# Patient Record
Sex: MALE | Race: WHITE | NOT HISPANIC OR LATINO | Employment: FULL TIME | URBAN - METROPOLITAN AREA
[De-identification: names, ages, dates, MRNs, and addresses within clinical notes are randomized per-mention and may not be internally consistent; named-entity substitution may affect disease eponyms.]

---

## 2018-10-02 ENCOUNTER — TRANSCRIBE ORDERS (OUTPATIENT)
Dept: ADMINISTRATIVE | Age: 23
End: 2018-10-02

## 2018-10-02 ENCOUNTER — APPOINTMENT (OUTPATIENT)
Dept: LAB | Age: 23
End: 2018-10-02
Attending: PREVENTIVE MEDICINE

## 2018-10-02 DIAGNOSIS — Z11.1 SCREENING EXAMINATION FOR PULMONARY TUBERCULOSIS: Primary | ICD-10-CM

## 2018-10-02 DIAGNOSIS — Z11.1 SCREENING EXAMINATION FOR PULMONARY TUBERCULOSIS: ICD-10-CM

## 2018-10-02 PROCEDURE — 36415 COLL VENOUS BLD VENIPUNCTURE: CPT

## 2018-10-02 PROCEDURE — 86480 TB TEST CELL IMMUN MEASURE: CPT

## 2018-10-03 LAB
GAMMA INTERFERON BACKGROUND BLD IA-ACNC: 0.03 IU/ML
M TB IFN-G BLD-IMP: NEGATIVE
M TB IFN-G CD4+ BCKGRND COR BLD-ACNC: 0 IU/ML
M TB IFN-G CD4+ BCKGRND COR BLD-ACNC: 0 IU/ML
MITOGEN IGNF BCKGRD COR BLD-ACNC: >10 IU/ML

## 2020-01-21 ENCOUNTER — HOSPITAL ENCOUNTER (EMERGENCY)
Facility: HOSPITAL | Age: 25
Discharge: HOME | End: 2020-01-21
Attending: EMERGENCY MEDICINE | Admitting: EMERGENCY MEDICINE
Payer: OTHER MISCELLANEOUS

## 2020-01-21 VITALS
BODY MASS INDEX: 37.71 KG/M2 | OXYGEN SATURATION: 97 % | HEART RATE: 69 BPM | RESPIRATION RATE: 15 BRPM | SYSTOLIC BLOOD PRESSURE: 134 MMHG | HEIGHT: 72 IN | DIASTOLIC BLOOD PRESSURE: 93 MMHG | TEMPERATURE: 98.7 F | WEIGHT: 278.44 LBS

## 2020-01-21 DIAGNOSIS — Z77.21 CONTACT W AND EXPOSURE TO POTENTIALLY HAZARDOUS BODY FLUIDS: Primary | ICD-10-CM

## 2020-01-21 LAB
ALT SERPL W P-5'-P-CCNC: 30 U/L (ref 12–78)
HBV SURFACE AB SER-ACNC: 43.99 MIU/ML
HBV SURFACE AG SER QL: NORMAL
HCV AB SER QL: NORMAL

## 2020-01-21 PROCEDURE — 36415 COLL VENOUS BLD VENIPUNCTURE: CPT | Performed by: EMERGENCY MEDICINE

## 2020-01-21 PROCEDURE — 86803 HEPATITIS C AB TEST: CPT | Performed by: EMERGENCY MEDICINE

## 2020-01-21 PROCEDURE — 99282 EMERGENCY DEPT VISIT SF MDM: CPT

## 2020-01-21 PROCEDURE — 87389 HIV-1 AG W/HIV-1&-2 AB AG IA: CPT | Performed by: EMERGENCY MEDICINE

## 2020-01-21 PROCEDURE — 84460 ALANINE AMINO (ALT) (SGPT): CPT | Performed by: EMERGENCY MEDICINE

## 2020-01-21 PROCEDURE — 99284 EMERGENCY DEPT VISIT MOD MDM: CPT | Performed by: EMERGENCY MEDICINE

## 2020-01-21 PROCEDURE — 87340 HEPATITIS B SURFACE AG IA: CPT | Performed by: EMERGENCY MEDICINE

## 2020-01-21 PROCEDURE — 86706 HEP B SURFACE ANTIBODY: CPT | Performed by: EMERGENCY MEDICINE

## 2020-01-21 NOTE — ED PROVIDER NOTES
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, the source patient does not have any known blood borne infectious diseases  He was started with a suture needle, solid point needle  He irrigated the area  He is up-to-date on tetanus  He has no other injuries  None       History reviewed  No pertinent past medical history  History reviewed  No pertinent surgical history  History reviewed  No pertinent family history  I have reviewed and agree with the history as documented  Social History     Tobacco Use    Smoking status: Never Smoker    Smokeless tobacco: Never Used   Substance Use Topics    Alcohol use: Not Currently     Frequency: Never    Drug use: Never        Review of Systems   Constitutional: Negative for appetite change, chills, fatigue and fever  HENT: Negative for sneezing and sore throat  Eyes: Negative for visual disturbance  Respiratory: Negative for cough, choking, chest tightness, shortness of breath and wheezing  Cardiovascular: Negative for chest pain and palpitations  Gastrointestinal: Negative for abdominal pain, constipation, diarrhea, nausea and vomiting  Genitourinary: Negative for difficulty urinating and dysuria  Neurological: Negative for dizziness, weakness, light-headedness, numbness and headaches  All other systems reviewed and are negative  Physical Exam  Physical Exam   Constitutional: He is oriented to person, place, and time  He appears well-developed and well-nourished  No distress  HENT:   Head: Normocephalic and atraumatic  Mouth/Throat: Oropharynx is clear and moist    Eyes: Pupils are equal, round, and reactive to light  EOM are normal    Neck: No JVD present  No tracheal deviation present  Cardiovascular: Normal rate, regular rhythm, normal heart sounds and intact distal pulses  Exam reveals no gallop and no friction rub  No murmur heard  Pulmonary/Chest: Effort normal and breath sounds normal  No respiratory distress  He has no wheezes  He has no rales  Abdominal: Soft  Bowel sounds are normal  He exhibits no distension  There is no tenderness  There is no rebound and no guarding  Neurological: He is alert and oriented to person, place, and time  No cranial nerve deficit  He exhibits normal muscle tone  Skin: Skin is warm and dry  He is not diaphoretic  No pallor  Very small punctate injury to left index finger  Bleeding is controlled  Psychiatric: He has a normal mood and affect  His behavior is normal    Nursing note and vitals reviewed  Vital Signs  ED Triage Vitals [01/21/20 1410]   Temperature Pulse Respirations Blood Pressure SpO2   98 7 °F (37 1 °C) 69 15 134/93 97 %      Temp Source Heart Rate Source Patient Position - Orthostatic VS BP Location FiO2 (%)   Oral Monitor Sitting Right arm --      Pain Score       No Pain           Vitals:    01/21/20 1410   BP: 134/93   Pulse: 69   Patient Position - Orthostatic VS: Sitting         Visual Acuity      ED Medications  Medications - No data to display    Diagnostic Studies  Results Reviewed     Procedure Component Value Units Date/Time    ALT [[id number]]  (Normal) Collected:  01/21/20 1421    Lab Status:  Final result Specimen:  Blood from Arm, Left Updated:  01/21/20 1446     ALT 30 U/L     Hepatitis B surface antigen [[id number]] Collected:  01/21/20 1421    Lab Status: In process Specimen:  Blood from Arm, Left Updated:  01/21/20 1429    Hepatitis C antibody [[id number]] Collected:  01/21/20 1421    Lab Status: In process Specimen:  Blood from Arm, Left Updated:  01/21/20 1429    HIV 1/2 AG-AB combo [[id number]] Collected:  01/21/20 1421    Lab Status:   In process Specimen:  Blood from Arm, Left Updated:  01/21/20 1429 Hepatitis B surface antibody [[id number]] Collected:  01/21/20 1421    Lab Status: In process Specimen:  Blood from Arm, Left Updated:  01/21/20 1429                 No orders to display              Procedures  Procedures         ED Course                               MDM  Number of Diagnoses or Management Options  Diagnosis management comments: A 78-year-old male with nonsignificant blood exposure  Offered post prophylaxis slowly declining, will send off baseline labs, the care team for the source patient has been notified and is sending off a exposure source baseline as well  Discharge with instructions to follow-up with employee health as needed  Disposition  Final diagnoses:   None     ED Disposition     ED Disposition Condition Date/Time Comment    Discharge Stable Tue Jan 21, 2020  2:35 PM Albaro Dumont discharge to home/self care  Follow-up Information     Follow up With Specialties Details Why Contact [address]  As needed [facility] [address]  [phone]            There are no discharge medications for this patient  No discharge procedures on file      ED Provider  Electronically Signed by           Aleah Munroe MD  01/21/20 [address] [facility] [address], [place]  01/21/20 1443

## 2020-01-22 LAB — HIV 1+2 AB+HIV1 P24 AG SERPL QL IA: NORMAL

## 2020-02-07 ENCOUNTER — OFFICE VISIT (OUTPATIENT)
Dept: FAMILY MEDICINE CLINIC | Facility: CLINIC | Age: 25
End: 2020-02-07
Payer: COMMERCIAL

## 2020-02-07 VITALS
HEIGHT: 71 IN | RESPIRATION RATE: 16 BRPM | OXYGEN SATURATION: 98 % | BODY MASS INDEX: 38.39 KG/M2 | TEMPERATURE: 98.1 F | HEART RATE: 65 BPM | DIASTOLIC BLOOD PRESSURE: 72 MMHG | WEIGHT: 274.2 LBS | SYSTOLIC BLOOD PRESSURE: 122 MMHG

## 2020-02-07 DIAGNOSIS — K21.9 GASTROESOPHAGEAL REFLUX DISEASE WITHOUT ESOPHAGITIS: ICD-10-CM

## 2020-02-07 DIAGNOSIS — Z00.00 PHYSICAL EXAM, ANNUAL: Primary | ICD-10-CM

## 2020-02-07 PROCEDURE — 99385 PREV VISIT NEW AGE 18-39: CPT | Performed by: FAMILY MEDICINE

## 2020-02-07 RX ORDER — FAMOTIDINE 20 MG/1
20 TABLET, FILM COATED ORAL 2 TIMES DAILY
Qty: 30 TABLET | Refills: 5 | Status: SHIPPED | OUTPATIENT
Start: 2020-02-07 | End: 2020-11-11 | Stop reason: SDUPTHER

## 2020-02-07 NOTE — PROGRESS NOTES
BMI Counseling: Body mass index is 38 51 kg/m²  The BMI is above normal  Nutrition recommendations include decreasing portion sizes  Exercise recommendations include vigorous physical activity 75 minutes/week  Assessment/Plan:    No problem-specific Assessment & Plan notes found for this encounter  Diagnoses and all orders for this visit:    Physical exam, annual  Comments:  discussed need for healthy diet/exericse/weight loss  labs as ordered    Orders:  -     Comprehensive metabolic panel; Future  -     Lipid panel; Future  -     TSH, 3rd generation; Future  -     Hemoglobin A1C; Future    Gastroesophageal reflux disease without esophagitis  Comments:  start pepcid nightly  call if symptoms don't resolve  Orders:  -     famotidine (PEPCID) 20 mg tablet; Take 1 tablet (20 mg total) by mouth 2 (two) times a day        Subjective:      Patient ID: Sharon Brownlee is a 30 y o  male  HPI  Pt presents for physical exam   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  Feels well in general   Has some GERD a few times per week  Otherwise, he feels well and has no complaints  Due to coming out of school, he hasn't been eating or exercising regularly  Is seeing dentist soon  Up to date on imm's  The following portions of the patient's history were reviewed and updated as appropriate: allergies, current medications, past family history, past medical history, past social history, past surgical history and problem list     Review of Systems   Constitutional: Negative for chills, fatigue, fever and unexpected weight change  HENT: Negative for congestion, ear pain, hearing loss, postnasal drip, rhinorrhea, sinus pressure, sinus pain, sore throat, trouble swallowing and voice change  Eyes: Negative for pain, redness and visual disturbance  Respiratory: Negative for cough and shortness of breath  Cardiovascular: Negative for chest pain, palpitations and leg swelling     Gastrointestinal: Negative for abdominal pain, constipation, diarrhea and nausea  Endocrine: Negative for cold intolerance, heat intolerance, polydipsia and polyuria  Genitourinary: Negative for dysuria, frequency and urgency  Musculoskeletal: Negative for arthralgias, joint swelling and myalgias  Skin: Negative for rash  No suspicious lesions   Neurological: Negative for weakness, numbness and headaches  Hematological: Negative for adenopathy  Objective:      /72   Pulse 65   Temp 98 1 °F (36 7 °C) (Tympanic)   Resp 16   Ht 5' 10 75" (1 797 m)   Wt 124 kg (274 lb 3 2 oz)   SpO2 98%   BMI 38 51 kg/m²          Physical Exam   Constitutional: He is oriented to person, place, and time  He appears well-developed and well-nourished  No distress  HENT:   Head: Normocephalic and atraumatic  Right Ear: Tympanic membrane, external ear and ear canal normal    Left Ear: Tympanic membrane, external ear and ear canal normal    Nose: Nose normal    Mouth/Throat: Oropharynx is clear and moist and mucous membranes are normal  No oropharyngeal exudate  Eyes: Pupils are equal, round, and reactive to light  Conjunctivae and EOM are normal    Neck: No JVD present  Carotid bruit is not present  No thyromegaly present  Cardiovascular: Regular rhythm, S1 normal and S2 normal  Exam reveals no gallop, no S3, no S4 and no friction rub  No murmur heard  Pulmonary/Chest: Effort normal and breath sounds normal  He has no wheezes  He has no rhonchi  He has no rales  Abdominal: Soft  Bowel sounds are normal  He exhibits no distension  There is no tenderness  Lymphadenopathy:     He has no cervical adenopathy  Neurological: He is alert and oriented to person, place, and time  He has normal strength and normal reflexes  No cranial nerve deficit or sensory deficit  Psychiatric: He has a normal mood and affect   His behavior is normal  Judgment and thought content normal

## 2020-02-10 ENCOUNTER — APPOINTMENT (OUTPATIENT)
Dept: LAB | Facility: CLINIC | Age: 25
End: 2020-02-10
Payer: COMMERCIAL

## 2020-02-10 DIAGNOSIS — Z00.00 PHYSICAL EXAM, ANNUAL: ICD-10-CM

## 2020-02-10 LAB
ALBUMIN SERPL BCP-MCNC: 4 G/DL (ref 3.5–5)
ALP SERPL-CCNC: 79 U/L (ref 46–116)
ALT SERPL W P-5'-P-CCNC: 36 U/L (ref 12–78)
ANION GAP SERPL CALCULATED.3IONS-SCNC: 12 MMOL/L (ref 4–13)
AST SERPL W P-5'-P-CCNC: 18 U/L (ref 5–45)
BILIRUB SERPL-MCNC: 0.67 MG/DL (ref 0.2–1)
BUN SERPL-MCNC: 7 MG/DL (ref 5–25)
CALCIUM SERPL-MCNC: 9.5 MG/DL (ref 8.3–10.1)
CHLORIDE SERPL-SCNC: 105 MMOL/L (ref 100–108)
CHOLEST SERPL-MCNC: 160 MG/DL (ref 50–200)
CO2 SERPL-SCNC: 26 MMOL/L (ref 21–32)
CREAT SERPL-MCNC: 0.84 MG/DL (ref 0.6–1.3)
EST. AVERAGE GLUCOSE BLD GHB EST-MCNC: 103 MG/DL
GFR SERPL CREATININE-BSD FRML MDRD: 121 ML/MIN/1.73SQ M
GLUCOSE P FAST SERPL-MCNC: 100 MG/DL (ref 65–99)
HBA1C MFR BLD: 5.2 % (ref 4.2–6.3)
HDLC SERPL-MCNC: 51 MG/DL
LDLC SERPL CALC-MCNC: 94 MG/DL (ref 0–100)
NONHDLC SERPL-MCNC: 109 MG/DL
POTASSIUM SERPL-SCNC: 4 MMOL/L (ref 3.5–5.3)
PROT SERPL-MCNC: 7.8 G/DL (ref 6.4–8.2)
SODIUM SERPL-SCNC: 143 MMOL/L (ref 136–145)
TRIGL SERPL-MCNC: 74 MG/DL
TSH SERPL DL<=0.05 MIU/L-ACNC: 2.71 UIU/ML (ref 0.36–3.74)

## 2020-02-10 PROCEDURE — 80053 COMPREHEN METABOLIC PANEL: CPT

## 2020-02-10 PROCEDURE — 80061 LIPID PANEL: CPT

## 2020-02-10 PROCEDURE — 84443 ASSAY THYROID STIM HORMONE: CPT

## 2020-02-10 PROCEDURE — 36415 COLL VENOUS BLD VENIPUNCTURE: CPT

## 2020-02-10 PROCEDURE — 83036 HEMOGLOBIN GLYCOSYLATED A1C: CPT

## 2020-11-11 DIAGNOSIS — K21.9 GASTROESOPHAGEAL REFLUX DISEASE WITHOUT ESOPHAGITIS: ICD-10-CM

## 2020-11-12 RX ORDER — FAMOTIDINE 20 MG/1
20 TABLET, FILM COATED ORAL 2 TIMES DAILY
Qty: 60 TABLET | Refills: 5 | Status: SHIPPED | OUTPATIENT
Start: 2020-11-12 | End: 2022-08-09

## 2020-12-20 ENCOUNTER — IMMUNIZATIONS (OUTPATIENT)
Dept: FAMILY MEDICINE CLINIC | Facility: HOSPITAL | Age: 25
End: 2020-12-20
Payer: COMMERCIAL

## 2020-12-20 DIAGNOSIS — Z23 ENCOUNTER FOR IMMUNIZATION: ICD-10-CM

## 2020-12-20 PROCEDURE — 91300 SARS-COV-2 / COVID-19 MRNA VACCINE (PFIZER-BIONTECH) 30 MCG: CPT

## 2020-12-20 PROCEDURE — 0001A SARS-COV-2 / COVID-19 MRNA VACCINE (PFIZER-BIONTECH) 30 MCG: CPT

## 2020-12-21 DIAGNOSIS — K21.9 GASTROESOPHAGEAL REFLUX DISEASE WITHOUT ESOPHAGITIS: ICD-10-CM

## 2020-12-21 RX ORDER — FAMOTIDINE 20 MG/1
20 TABLET, FILM COATED ORAL 2 TIMES DAILY
Qty: 60 TABLET | Refills: 0 | Status: CANCELLED | OUTPATIENT
Start: 2020-12-21

## 2020-12-21 NOTE — TELEPHONE ENCOUNTER
Medication refill request: Pepcid  Last office visit: 2/7/20  Next office visit: 2/10/21  Last refilled: 11/12/20 #60x5  Pharmacy: [facility]/pharmacy #[id number] Nettles [address] [place], [place] [address]  Phone: [phone] Fax: [phone]      Denied  Network Hardware Resale message sent to patient

## 2021-01-08 ENCOUNTER — IMMUNIZATIONS (OUTPATIENT)
Dept: FAMILY MEDICINE CLINIC | Facility: HOSPITAL | Age: 26
End: 2021-01-08

## 2021-01-08 DIAGNOSIS — Z23 ENCOUNTER FOR IMMUNIZATION: ICD-10-CM

## 2021-01-08 PROCEDURE — 0002A SARS-COV-2 / COVID-19 MRNA VACCINE (PFIZER-BIONTECH) 30 MCG: CPT

## 2021-01-08 PROCEDURE — 91300 SARS-COV-2 / COVID-19 MRNA VACCINE (PFIZER-BIONTECH) 30 MCG: CPT

## 2021-02-10 ENCOUNTER — OFFICE VISIT (OUTPATIENT)
Dept: FAMILY MEDICINE CLINIC | Facility: CLINIC | Age: 26
End: 2021-02-10
Payer: COMMERCIAL

## 2021-02-10 ENCOUNTER — TRANSCRIBE ORDERS (OUTPATIENT)
Dept: LAB | Facility: CLINIC | Age: 26
End: 2021-02-10

## 2021-02-10 ENCOUNTER — LAB (OUTPATIENT)
Dept: LAB | Facility: CLINIC | Age: 26
End: 2021-02-10
Payer: COMMERCIAL

## 2021-02-10 VITALS
DIASTOLIC BLOOD PRESSURE: 80 MMHG | TEMPERATURE: 96.9 F | HEART RATE: 59 BPM | BODY MASS INDEX: 39.28 KG/M2 | OXYGEN SATURATION: 99 % | RESPIRATION RATE: 20 BRPM | HEIGHT: 71 IN | SYSTOLIC BLOOD PRESSURE: 118 MMHG | WEIGHT: 280.6 LBS

## 2021-02-10 DIAGNOSIS — K21.9 GASTROESOPHAGEAL REFLUX DISEASE WITHOUT ESOPHAGITIS: ICD-10-CM

## 2021-02-10 DIAGNOSIS — Z00.00 PHYSICAL EXAM, ANNUAL: ICD-10-CM

## 2021-02-10 DIAGNOSIS — Z23 ENCOUNTER FOR IMMUNIZATION: ICD-10-CM

## 2021-02-10 DIAGNOSIS — Z00.00 PHYSICAL EXAM, ANNUAL: Primary | ICD-10-CM

## 2021-02-10 LAB
ALBUMIN SERPL BCP-MCNC: 4.2 G/DL (ref 3.5–5)
ALP SERPL-CCNC: 79 U/L (ref 46–116)
ALT SERPL W P-5'-P-CCNC: 80 U/L (ref 12–78)
ANION GAP SERPL CALCULATED.3IONS-SCNC: 8 MMOL/L (ref 4–13)
AST SERPL W P-5'-P-CCNC: 32 U/L (ref 5–45)
BILIRUB SERPL-MCNC: 0.48 MG/DL (ref 0.2–1)
BUN SERPL-MCNC: 11 MG/DL (ref 5–25)
CALCIUM SERPL-MCNC: 9.4 MG/DL (ref 8.3–10.1)
CHLORIDE SERPL-SCNC: 105 MMOL/L (ref 100–108)
CHOLEST SERPL-MCNC: 175 MG/DL (ref 50–200)
CO2 SERPL-SCNC: 28 MMOL/L (ref 21–32)
CREAT SERPL-MCNC: 0.88 MG/DL (ref 0.6–1.3)
EST. AVERAGE GLUCOSE BLD GHB EST-MCNC: 105 MG/DL
GFR SERPL CREATININE-BSD FRML MDRD: 118 ML/MIN/1.73SQ M
GLUCOSE P FAST SERPL-MCNC: 106 MG/DL (ref 65–99)
HBA1C MFR BLD: 5.3 %
HDLC SERPL-MCNC: 58 MG/DL
LDLC SERPL CALC-MCNC: 109 MG/DL (ref 0–100)
NONHDLC SERPL-MCNC: 117 MG/DL
POTASSIUM SERPL-SCNC: 4.9 MMOL/L (ref 3.5–5.3)
PROT SERPL-MCNC: 7.6 G/DL (ref 6.4–8.2)
SODIUM SERPL-SCNC: 141 MMOL/L (ref 136–145)
TRIGL SERPL-MCNC: 41 MG/DL

## 2021-02-10 PROCEDURE — 80053 COMPREHEN METABOLIC PANEL: CPT

## 2021-02-10 PROCEDURE — 99395 PREV VISIT EST AGE 18-39: CPT | Performed by: FAMILY MEDICINE

## 2021-02-10 PROCEDURE — 36415 COLL VENOUS BLD VENIPUNCTURE: CPT

## 2021-02-10 PROCEDURE — 83036 HEMOGLOBIN GLYCOSYLATED A1C: CPT

## 2021-02-10 PROCEDURE — 90715 TDAP VACCINE 7 YRS/> IM: CPT | Performed by: FAMILY MEDICINE

## 2021-02-10 PROCEDURE — 90471 IMMUNIZATION ADMIN: CPT | Performed by: FAMILY MEDICINE

## 2021-02-10 PROCEDURE — 80061 LIPID PANEL: CPT

## 2021-02-10 NOTE — PATIENT INSTRUCTIONS
Work on diet and exercise and stress reduction  Obtain labs  Use your vacation  Check into gaurdasil status and call for nurse appt if you want to start the series

## 2021-02-10 NOTE — PROGRESS NOTES
Assessment/Plan:    No problem-specific Assessment & Plan notes found for this encounter  Diagnoses and all orders for this visit:    Physical exam, annual  Comments:  work on healthy diet and exercise  work on stress reduction/counseling   labs as ordered    Orders:  -     Comprehensive metabolic panel; Future  -     Hemoglobin A1C; Future  -     Lipid panel; Future    Encounter for immunization  -     tetanus-diphtheria-acellular pertussis (ADACEL) 5-2-15 5 LF-mcg/0 5 injection; Inject 0 5 mL into a muscle once for 1 dose  -     TDAP VACCINE GREATER THAN OR EQUAL TO 6YO IM    Gastroesophageal reflux disease without esophagitis  Comments:  continue daily pepcid  controlled        Subjective:      Patient ID: Sharon Brownlee is a 30 y o  male  Who presents for physical exam   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  Trying to work on stress reduction  Mom was just dx'd with stg 4 RCC  He is the only medical person in the fam, so it has been very stressful  Seeing counselor  Otherwise feeling well  No complaints  pepcid is working well for control of GERD  No breakthrough on meds, but heartburn returns if he stops    HPI    The following portions of the patient's history were reviewed and updated as appropriate: allergies, current medications, past family history, past medical history, past social history, past surgical history and problem list     Review of Systems   Constitutional: Negative for chills, fatigue, fever and unexpected weight change  HENT: Negative for congestion, ear pain, hearing loss, postnasal drip, rhinorrhea, sinus pressure, sinus pain, sore throat, trouble swallowing and voice change  Eyes: Negative for pain, redness and visual disturbance  Respiratory: Negative for cough and shortness of breath  Cardiovascular: Negative for chest pain, palpitations and leg swelling  Gastrointestinal: Negative for abdominal pain, constipation, diarrhea and nausea     Endocrine: Negative for cold intolerance, heat intolerance, polydipsia and polyuria  Genitourinary: Negative for dysuria, frequency and urgency  Musculoskeletal: Negative for arthralgias, joint swelling and myalgias  Skin: Negative for rash  No suspicious lesions   Neurological: Negative for weakness, numbness and headaches  Hematological: Negative for adenopathy  Objective:      /80   Pulse 59   Temp (!) 96 9 °F (36 1 °C)   Resp 20   Ht 5' 11 38" (1 813 m)   Wt 127 kg (280 lb 9 6 oz)   SpO2 99%   BMI 38 72 kg/m²          Physical Exam  Constitutional:       General: He is not in acute distress  Appearance: He is well-developed  HENT:      Head: Normocephalic and atraumatic  Right Ear: Tympanic membrane, ear canal and external ear normal       Left Ear: Tympanic membrane, ear canal and external ear normal       Nose: Nose normal       Mouth/Throat:      Pharynx: Posterior oropharyngeal erythema present  No oropharyngeal exudate  Eyes:      Conjunctiva/sclera: Conjunctivae normal       Pupils: Pupils are equal, round, and reactive to light  Neck:      Thyroid: No thyromegaly  Vascular: No carotid bruit or JVD  Cardiovascular:      Rate and Rhythm: Regular rhythm  Heart sounds: S1 normal and S2 normal  No murmur  No friction rub  No gallop  No S3 or S4 sounds  Pulmonary:      Effort: Pulmonary effort is normal       Breath sounds: Normal breath sounds  No wheezing, rhonchi or rales  Abdominal:      General: Bowel sounds are normal  There is no distension  Palpations: Abdomen is soft  Tenderness: There is no abdominal tenderness  Lymphadenopathy:      Cervical: No cervical adenopathy  Neurological:      Mental Status: He is alert and oriented to person, place, and time  Cranial Nerves: No cranial nerve deficit  Sensory: No sensory deficit  Deep Tendon Reflexes: Reflexes are normal and symmetric

## 2021-02-26 ENCOUNTER — TELEPHONE (OUTPATIENT)
Dept: FAMILY MEDICINE CLINIC | Facility: CLINIC | Age: 26
End: 2021-02-26

## 2021-02-26 ENCOUNTER — HOSPITAL ENCOUNTER (EMERGENCY)
Facility: HOSPITAL | Age: 26
Discharge: HOME | End: 2021-02-27
Attending: EMERGENCY MEDICINE | Admitting: EMERGENCY MEDICINE
Payer: COMMERCIAL

## 2021-02-26 VITALS
OXYGEN SATURATION: 99 % | RESPIRATION RATE: 16 BRPM | HEIGHT: 71 IN | WEIGHT: 275 LBS | SYSTOLIC BLOOD PRESSURE: 156 MMHG | BODY MASS INDEX: 38.5 KG/M2 | DIASTOLIC BLOOD PRESSURE: 103 MMHG | HEART RATE: 80 BPM | TEMPERATURE: 97.9 F

## 2021-02-26 DIAGNOSIS — M54.50 LOW BACK PAIN: Primary | ICD-10-CM

## 2021-02-26 PROCEDURE — 99283 EMERGENCY DEPT VISIT LOW MDM: CPT

## 2021-02-26 PROCEDURE — 99284 EMERGENCY DEPT VISIT MOD MDM: CPT | Performed by: EMERGENCY MEDICINE

## 2021-02-27 ENCOUNTER — APPOINTMENT (EMERGENCY)
Dept: RADIOLOGY | Facility: HOSPITAL | Age: 26
End: 2021-02-27
Payer: COMMERCIAL

## 2021-02-27 PROCEDURE — G1004 CDSM NDSC: HCPCS

## 2021-02-27 PROCEDURE — 72192 CT PELVIS W/O DYE: CPT

## 2021-02-27 NOTE — ED ATTENDING ATTESTATION
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/buttock pain and buttock paresthesia after sustaining a fall  Two days ago, patient slipped and fell directly onto his buttocks  He felt severe pain that knocked the wind out of him and had persistent pain in that area since then  He has been taking Tylenol and Motrin for pain  He has been able to ambulate after the fall  He has noticed some numbness in the area of the gluteal cleft after the fall  He has not had any difficulty with urination or bowel movements  He has not had any numbness or tingling traveling down either leg  He denies muscle weakness in either extremity  He is not on blood thinners  No history of cancer  No history of chronic steroid therapy  Did not fall back, did not strike head or lose consciousness      ROS:  Constitutional: denies fevers  Cardiac: no chest pain  Respiratory: no shortness of breath  GI: no abdominal pain  : no difficulty with urination  Heme/Onc: no easy bruising  Endocrine: no diabetes  Neuro: as above, no headaches    Ten systems reviewed and negative unless otherwise noted in HPI and above      Physical Exam  Vitals:    02/26/21 2257   BP: (!) 156/103   TempSrc: Tympanic   Pulse: 80   Resp: 16   Patient Position - Orthostatic VS: Sitting   Temp: 97 9 °F (36 6 °C)     SPO2 RA Rest ED from 2/26/2021 in [address] [place] [address] Emergency Department   SpO2  99 %   SpO2 Activity  At Rest   O2 Device  None (Room air)   O2 Flow Rate  --          Constitutional:  Awake, alert, oriented  No acute distress  HEENT:  Normocephalic, atraumatic  Sclera anicteric, conjunctiva not injected  Moist oral mucosa  Cardiac:  Regular rate and rhythm, no murmurs, rubs, or gallops  2+ radial pulses  Respiratory:  Lungs are clear to auscultation bilaterally, no wheezes or rales  Abdomen:  Nondistended  Bowel sounds present  No tenderness to palpation  No rebound or guarding  Musculoskeletal:  No deformities, no edema  There is tenderness with palpation over the lower sacral region, there is mild lower bilateral paraspinal muscle tenderness  There are no step-offs  There is no bony instability  Area of the intergluteal cleft is with decreased sensation to light touch  Integument:  No rashes over exposed areas, cap refill less than 2 seconds  Neurologic:  Awake, alert, and oriented x3   5/5 strength in bilateral lower extremities  Psychiatric:  Normal affect    Tests  CT pelvis wo contrast   Final Result      No acute findings         Workstation performed: [id number]               ED Course  Medications - No data to display    69-year-old male presenting with buttock numbness after sustaining a fall onto the area 2 days ago  Vital signs reviewed, hypertensive, within normal limits otherwise    Differential diagnosis includes decreased sensation following impact/contusion to the area with mild associated neurapraxia of cutaneous nerves, with other etiologies including sacral/coccygeal fracture, with other etiologies including cauda equina compression such as due to fracture, disc herniation, etc   Patient is able to control bowel and bladder, he has not had other red flags for cauda equina compression, given the numbness in the region that patient landed on, anticipate this is cutaneous nerve neurapraxia  CT of the pelvis obtained revealing no evidence of acute fracture dislocation of the sacrococcygeal region  For now, recommend watchful waiting and analgesia with Tylenol and Motrin as well as possibly obtaining a donut to allow offloading of the injured area  Patient is to return to ER right away if there is progression of symptoms, including but not limited to increase in the numbness intensity or numbness it area, difficulty with bowel or bladder control or with obtaining erection, motor deficits traveling down either leg      Clinical Impression  Final diagnoses:   Low back pain       Discharge Medication List as of 2/27/2021 12:59 AM      CONTINUE these medications which have NOT CHANGED    Details   famotidine (PEPCID) 20 mg tablet Take 1 tablet (20 mg total) by mouth 2 (two) times a day, Starting u 11/12/2020, Normal

## 2021-02-27 NOTE — ED PROVIDER NOTES
History  Chief Complaint   Patient presents with    Back Pain     pt states he fell two days ago onto his back  reports low back pain  reports tingling along buttocks  39-year-old male no major past medical history presenting due to fall and back pain with paresthesia  Patient states around 2 days ago he fell backwards and landed on his buttock  States he immediately had severe pain that not the wind out of him and has had persistent pain in that area since then  Says the pain is localized chest superior to the gluteal cleft, and with certain positional changes he feels lower lumbar paraspinal pain  States the pain has slightly improved with Tylenol and Motrin, certain position changes aggravate the pain  States he is concerned because there is an area paresthesia in his lower back that he notices when he is going to the bathroom  He denies any constipation, bloody stool, urinary retention or frequency  Denies any numbness in his groin or on his genitals  Patient is on any blood thinning medication and denies any recent steroid medication  Denies any chest pain, dyspnea, weakness numbness or tingling in extremities  Prior to Admission Medications   Prescriptions Last Dose Informant Patient Reported?  Taking?   famotidine (PEPCID) 20 mg tablet   No Yes   Sig: Take 1 tablet (20 mg total) by mouth 2 (two) times a day      Facility-Administered Medications: None       Past Medical History:   Diagnosis Date    Allergic     Seasonal, almonds    GERD (gastroesophageal reflux disease)     Obesity        Past Surgical History:   Procedure Laterality Date    WISDOM TOOTH EXTRACTION         Family History   Problem Relation Age of Onset    Cancer Paternal Grandfather         Metastatic prostate cancer    Cancer Paternal Uncle         Prostate cancer with prostatectomy    Hyperlipidemia Father     ADD / ADHD Brother    Love Olivares ADD / ADHD Brother     Hypertension Mother     Hypothyroidism Mother    Love Olivares Basal cell carcinoma Mother     No Known Problems Sister     No Known Problems Sister     No Known Problems Sister      I have reviewed and agree with the history as documented  E-Cigarette/Vaping     E-Cigarette/Vaping Substances     Social History     Tobacco Use    Smoking status: Never Smoker    Smokeless tobacco: Never Used   Substance Use Topics    Alcohol use: Yes     Alcohol/week: 3 0 - 5 0 standard drinks     Types: 1 - 2 Glasses of wine, 2 - 3 Cans of beer per week     Frequency: Never     Drinks per session: 1 or 2    Drug use: Never        Review of Systems   Constitutional: Negative for fever  Eyes: Negative for visual disturbance  Respiratory: Negative for shortness of breath  Cardiovascular: Negative for chest pain  Gastrointestinal: Negative for abdominal pain  Endocrine: Negative for polyuria  Genitourinary: Negative for decreased urine volume, difficulty urinating and urgency  Musculoskeletal: Positive for back pain  Negative for arthralgias and gait problem  Skin: Negative for wound  Neurological: Negative for syncope  All other systems reviewed and are negative  Physical Exam  ED Triage Vitals [02/26/21 2257]   Temperature Pulse Respirations Blood Pressure SpO2   97 9 °F (36 6 °C) 80 16 (!) 156/103 99 %      Temp Source Heart Rate Source Patient Position - Orthostatic VS BP Location FiO2 (%)   Tympanic Monitor Sitting Right arm --      Pain Score       3             Orthostatic Vital Signs  Vitals:    02/26/21 2257   BP: (!) 156/103   Pulse: 80   Patient Position - Orthostatic VS: Sitting       Physical Exam  Vitals signs and nursing note reviewed  Constitutional:       Appearance: He is well-developed  HENT:      Head: Normocephalic and atraumatic  Eyes:      Conjunctiva/sclera: Conjunctivae normal    Neck:      Musculoskeletal: Normal range of motion  Cardiovascular:      Rate and Rhythm: Normal rate and regular rhythm     Pulmonary:      Effort: Pulmonary effort is normal  No respiratory distress  Abdominal:      General: There is no distension  Musculoskeletal: Normal range of motion  General: Tenderness present  Comments: Lower sacral tenderness  Bilateral lumbar paraspinal tenderness  No stepoffs   Skin:     General: Skin is warm and dry  Neurological:      General: No focal deficit present  Mental Status: He is alert and oriented to person, place, and time  Psychiatric:         Mood and Affect: Mood normal          Behavior: Behavior normal          Thought Content: Thought content normal          Judgment: Judgment normal          ED Medications  Medications - No data to display    Diagnostic Studies  Results Reviewed     None                 CT pelvis wo contrast   Final Result by Armando Willams DO (02/27 3752)      No acute findings         Workstation performed: [id number]               Procedures  Procedures      ED Course                             SBIRT 22yo+      Most Recent Value   SBIRT (23 yo +)   In order to provide better care to our patients, we are screening all of our patients for alcohol and drug use  Would it be okay to ask you these screening questions? No Filed at: 02/26/2021 2317   Initial Alcohol Screen: US AUDIT-C    1  How often do you have a drink containing alcohol?  0 Filed at: 02/26/2021 2317   2  How many drinks containing alcohol do you have on a typical day you are drinking? 0 Filed at: 02/26/2021 2317   3a  Male UNDER 65: How often do you have five or more drinks on one occasion? 0 Filed at: 02/26/2021 2317   Audit-C Score  0 Filed at: 02/26/2021 2317   CARLOS: How many times in the past year have you    Used an illegal drug or used a prescription medication for non-medical reasons?   Never Filed at: 02/26/2021 2317                MDM  Number of Diagnoses or Management Options  Low back pain:   Diagnosis management comments: Patient denies any red flags such as recent corticosteroid use, urinary or bowel retention, focal neurological deficit  Patient was concerned over decreased sensation over site tenderness  Denies any anesthesia in his groin  Low suspicion for cauda equina this time  Non-con CT of pelvis ordered over concern for possible fracture, no acute findings on CT  Suspect local neuropraxia at area of impact  Patient is very reliable and was provided strict return precautions should any red flag symptoms develop  Disposition  Final diagnoses:   Low back pain     Time reflects when diagnosis was documented in both MDM as applicable and the Disposition within this note     Time User Action Codes Description Comment    2/27/2021 12:58 AM Alba Dominguez Add [M54 5] Low back pain       ED Disposition     ED Disposition Condition Date/Time Comment    Discharge Stable Sat Feb 27, 2021 12:58 AM Mary Nowak discharge to home/self care  Follow-up Information     Follow up With Specialties Details Why Contact Info Additional Information    [address] [facility]   [address] [id number]  [address] [place] [address], [address] [place] [address], [address]  [facility]          Discharge Medication List as of 2/27/2021 12:59 AM      CONTINUE these medications which have NOT CHANGED    Details   famotidine (PEPCID) 20 mg tablet Take 1 tablet (20 mg total) by mouth 2 (two) times a day, Starting Thu 11/12/2020, Normal           No discharge procedures on file  PDMP Review     None           ED Provider  Attending physically available and evaluated Mary Nowak I managed the patient along with the ED Attending      Electronically Signed by         Hue Hill DO  02/27/21 1702

## 2021-10-14 ENCOUNTER — IMMUNIZATIONS (OUTPATIENT)
Dept: FAMILY MEDICINE CLINIC | Facility: HOSPITAL | Age: 26
End: 2021-10-14

## 2021-10-14 DIAGNOSIS — Z23 ENCOUNTER FOR IMMUNIZATION: Primary | ICD-10-CM

## 2021-10-14 PROCEDURE — 91300 SARS-COV-2 / COVID-19 MRNA VACCINE (PFIZER-BIONTECH) 30 MCG: CPT

## 2021-10-14 PROCEDURE — 0001A SARS-COV-2 / COVID-19 MRNA VACCINE (PFIZER-BIONTECH) 30 MCG: CPT

## 2022-02-07 ENCOUNTER — TELEPHONE (OUTPATIENT)
Dept: FAMILY MEDICINE CLINIC | Facility: CLINIC | Age: 27
End: 2022-02-07

## 2022-02-07 NOTE — TELEPHONE ENCOUNTER
Patient has an upcoming PE appointment on 02/15/2022  Would you like to order labs? Last labs were 02/2021

## 2022-02-15 ENCOUNTER — APPOINTMENT (OUTPATIENT)
Dept: LAB | Facility: CLINIC | Age: 27
End: 2022-02-15
Payer: COMMERCIAL

## 2022-02-15 ENCOUNTER — OFFICE VISIT (OUTPATIENT)
Dept: FAMILY MEDICINE CLINIC | Facility: CLINIC | Age: 27
End: 2022-02-15
Payer: COMMERCIAL

## 2022-02-15 VITALS
OXYGEN SATURATION: 99 % | WEIGHT: 312.2 LBS | DIASTOLIC BLOOD PRESSURE: 100 MMHG | TEMPERATURE: 97.6 F | BODY MASS INDEX: 43.71 KG/M2 | SYSTOLIC BLOOD PRESSURE: 142 MMHG | HEART RATE: 95 BPM | HEIGHT: 71 IN | RESPIRATION RATE: 18 BRPM

## 2022-02-15 DIAGNOSIS — Z00.00 PHYSICAL EXAM, ANNUAL: Primary | ICD-10-CM

## 2022-02-15 DIAGNOSIS — I10 PRIMARY HYPERTENSION: ICD-10-CM

## 2022-02-15 DIAGNOSIS — E66.01 CLASS 3 SEVERE OBESITY DUE TO EXCESS CALORIES WITHOUT SERIOUS COMORBIDITY WITH BODY MASS INDEX (BMI) OF 40.0 TO 44.9 IN ADULT (HCC): ICD-10-CM

## 2022-02-15 DIAGNOSIS — Z00.00 PHYSICAL EXAM, ANNUAL: ICD-10-CM

## 2022-02-15 LAB
ALBUMIN SERPL BCP-MCNC: 4.1 G/DL (ref 3.5–5)
ALP SERPL-CCNC: 79 U/L (ref 46–116)
ALT SERPL W P-5'-P-CCNC: 62 U/L (ref 12–78)
ANION GAP SERPL CALCULATED.3IONS-SCNC: 8 MMOL/L (ref 4–13)
AST SERPL W P-5'-P-CCNC: 23 U/L (ref 5–45)
BASOPHILS # BLD AUTO: 0.03 THOUSANDS/ΜL (ref 0–0.1)
BASOPHILS NFR BLD AUTO: 1 % (ref 0–1)
BILIRUB SERPL-MCNC: 0.45 MG/DL (ref 0.2–1)
BUN SERPL-MCNC: 14 MG/DL (ref 5–25)
CALCIUM SERPL-MCNC: 9.4 MG/DL (ref 8.3–10.1)
CHLORIDE SERPL-SCNC: 103 MMOL/L (ref 100–108)
CHOLEST SERPL-MCNC: 187 MG/DL
CO2 SERPL-SCNC: 27 MMOL/L (ref 21–32)
CREAT SERPL-MCNC: 0.83 MG/DL (ref 0.6–1.3)
EOSINOPHIL # BLD AUTO: 0.2 THOUSAND/ΜL (ref 0–0.61)
EOSINOPHIL NFR BLD AUTO: 4 % (ref 0–6)
ERYTHROCYTE [DISTWIDTH] IN BLOOD BY AUTOMATED COUNT: 12.7 % (ref 11.6–15.1)
EST. AVERAGE GLUCOSE BLD GHB EST-MCNC: 108 MG/DL
GFR SERPL CREATININE-BSD FRML MDRD: 119 ML/MIN/1.73SQ M
GLUCOSE P FAST SERPL-MCNC: 110 MG/DL (ref 65–99)
HBA1C MFR BLD: 5.4 %
HCT VFR BLD AUTO: 46 % (ref 36.5–49.3)
HDLC SERPL-MCNC: 57 MG/DL
HGB BLD-MCNC: 15.9 G/DL (ref 12–17)
IMM GRANULOCYTES # BLD AUTO: 0.02 THOUSAND/UL (ref 0–0.2)
IMM GRANULOCYTES NFR BLD AUTO: 0 % (ref 0–2)
LDLC SERPL CALC-MCNC: 120 MG/DL (ref 0–100)
LYMPHOCYTES # BLD AUTO: 1.87 THOUSANDS/ΜL (ref 0.6–4.47)
LYMPHOCYTES NFR BLD AUTO: 35 % (ref 14–44)
MCH RBC QN AUTO: 31.4 PG (ref 26.8–34.3)
MCHC RBC AUTO-ENTMCNC: 34.6 G/DL (ref 31.4–37.4)
MCV RBC AUTO: 91 FL (ref 82–98)
MONOCYTES # BLD AUTO: 0.54 THOUSAND/ΜL (ref 0.17–1.22)
MONOCYTES NFR BLD AUTO: 10 % (ref 4–12)
NEUTROPHILS # BLD AUTO: 2.67 THOUSANDS/ΜL (ref 1.85–7.62)
NEUTS SEG NFR BLD AUTO: 50 % (ref 43–75)
NONHDLC SERPL-MCNC: 130 MG/DL
NRBC BLD AUTO-RTO: 0 /100 WBCS
PLATELET # BLD AUTO: 277 THOUSANDS/UL (ref 149–390)
PMV BLD AUTO: 11.2 FL (ref 8.9–12.7)
POTASSIUM SERPL-SCNC: 4.2 MMOL/L (ref 3.5–5.3)
PROT SERPL-MCNC: 8.4 G/DL (ref 6.4–8.2)
RBC # BLD AUTO: 5.07 MILLION/UL (ref 3.88–5.62)
SODIUM SERPL-SCNC: 138 MMOL/L (ref 136–145)
TRIGL SERPL-MCNC: 52 MG/DL
TSH SERPL DL<=0.05 MIU/L-ACNC: 3.11 UIU/ML (ref 0.36–3.74)
WBC # BLD AUTO: 5.33 THOUSAND/UL (ref 4.31–10.16)

## 2022-02-15 PROCEDURE — 80061 LIPID PANEL: CPT

## 2022-02-15 PROCEDURE — 84443 ASSAY THYROID STIM HORMONE: CPT

## 2022-02-15 PROCEDURE — 80053 COMPREHEN METABOLIC PANEL: CPT

## 2022-02-15 PROCEDURE — 85025 COMPLETE CBC W/AUTO DIFF WBC: CPT

## 2022-02-15 PROCEDURE — 99395 PREV VISIT EST AGE 18-39: CPT | Performed by: FAMILY MEDICINE

## 2022-02-15 PROCEDURE — 36415 COLL VENOUS BLD VENIPUNCTURE: CPT

## 2022-02-15 PROCEDURE — 83036 HEMOGLOBIN GLYCOSYLATED A1C: CPT

## 2022-02-15 NOTE — PROGRESS NOTES
Assessment/Plan:    No problem-specific Assessment & Plan notes found for this encounter  Diagnoses and all orders for this visit:    Physical exam, annual  Comments:  work on diet/exercise weight loss  obtain labs  otherwise up to date preventively    Orders:  -     CBC and differential; Future  -     Comprehensive metabolic panel; Future  -     Lipid panel; Future  -     Hemoglobin A1C; Future    Class 3 severe obesity due to excess calories without serious comorbidity with body mass index (BMI) of 40 0 to 44 9 in adult (HCC)  -     TSH, 3rd generation; Future  -     Ambulatory Referral to Weight Management; Future    Primary hypertension  Comments:  labs as ordered  discussed meds vs weight loss and recheck   would like to recheck in 3 mo and work on weight loss in meantime  if elevated in 3 mo, meds        Subjective:      Patient ID: Wilbert Orona is a 27 y o  male  HPI  Pt presents for physical exam   Up to date on [facility]'s  Sees dentist   Has gained 30 lbs over the last year  Admits limited exercise and poor diet  Interested in [employer] program as he knows he does better with accountability    Due for labs  Blood pressure has been elevated when he checks at home over the last year  475P systolic  No chest pain, shortness of breath, n/v, abd pain, visual changes, paresthesias, weakness  The following portions of the patient's history were reviewed and updated as appropriate: allergies, current medications, past family history, past medical history, past social history, past surgical history and problem list     Review of Systems   Constitutional: Negative for chills, fatigue, fever and unexpected weight change  HENT: Negative for congestion, ear pain, hearing loss, postnasal drip, rhinorrhea, sinus pressure, sinus pain, sore throat, trouble swallowing and voice change  Eyes: Negative for pain, redness and visual disturbance  Respiratory: Negative for cough and shortness of breath  Cardiovascular: Negative for chest pain, palpitations and leg swelling  Gastrointestinal: Negative for abdominal pain, constipation, diarrhea and nausea  Endocrine: Negative for cold intolerance, heat intolerance, polydipsia and polyuria  Genitourinary: Negative for dysuria, frequency and urgency  Musculoskeletal: Negative for arthralgias, joint swelling and myalgias  Skin: Negative for rash  No suspicious lesions   Neurological: Negative for weakness, numbness and headaches  Hematological: Negative for adenopathy  Objective:      /100   Pulse 95   Temp 97 6 °F (36 4 °C)   Resp 18   Ht 5' 11" (1 803 m)   Wt (!) 142 kg (312 lb 3 2 oz)   SpO2 99%   BMI 43 54 kg/m²          Physical Exam  Constitutional:       General: He is not in acute distress  Appearance: He is well-developed  He is obese  HENT:      Head: Normocephalic and atraumatic  Right Ear: Tympanic membrane, ear canal and external ear normal       Left Ear: Tympanic membrane, ear canal and external ear normal       Nose: Nose normal       Mouth/Throat:      Pharynx: No oropharyngeal exudate  Eyes:      Conjunctiva/sclera: Conjunctivae normal       Pupils: Pupils are equal, round, and reactive to light  Neck:      Thyroid: No thyromegaly  Vascular: No carotid bruit or JVD  Cardiovascular:      Rate and Rhythm: Regular rhythm  Heart sounds: S1 normal and S2 normal  No murmur heard  No friction rub  No gallop  No S3 or S4 sounds  Pulmonary:      Effort: Pulmonary effort is normal       Breath sounds: Normal breath sounds  No wheezing, rhonchi or rales  Abdominal:      General: Bowel sounds are normal  There is no distension  Palpations: Abdomen is soft  Tenderness: There is no abdominal tenderness  Lymphadenopathy:      Cervical: No cervical adenopathy  Neurological:      Mental Status: He is alert and oriented to person, place, and time        Cranial Nerves: No cranial nerve deficit  Sensory: No sensory deficit  Deep Tendon Reflexes: Reflexes are normal and symmetric  BMI Counseling: Body mass index is 43 54 kg/m²  The BMI is above normal  Nutrition recommendations include encouraging healthy choices of fruits and vegetables  Exercise recommendations include vigorous physical activity 75 minutes/week  Patient referred to weight management  Rationale for BMI follow-up plan is due to patient being overweight or obese  Depression Screening and Follow-up Plan: Patient was screened for depression during today's encounter  They screened negative with a PHQ-2 score of 1

## 2022-04-20 ENCOUNTER — CONSULT (OUTPATIENT)
Dept: BARIATRICS | Facility: CLINIC | Age: 27
End: 2022-04-20
Payer: COMMERCIAL

## 2022-04-20 VITALS
DIASTOLIC BLOOD PRESSURE: 82 MMHG | HEIGHT: 72 IN | TEMPERATURE: 97.4 F | HEART RATE: 92 BPM | BODY MASS INDEX: 42.66 KG/M2 | SYSTOLIC BLOOD PRESSURE: 140 MMHG | WEIGHT: 315 LBS

## 2022-04-20 DIAGNOSIS — Z91.89 RISK FACTORS FOR OBSTRUCTIVE SLEEP APNEA: ICD-10-CM

## 2022-04-20 DIAGNOSIS — E66.01 CLASS 3 SEVERE OBESITY DUE TO EXCESS CALORIES WITHOUT SERIOUS COMORBIDITY WITH BODY MASS INDEX (BMI) OF 40.0 TO 44.9 IN ADULT (HCC): Primary | ICD-10-CM

## 2022-04-20 DIAGNOSIS — K21.9 GASTROESOPHAGEAL REFLUX DISEASE WITHOUT ESOPHAGITIS: ICD-10-CM

## 2022-04-20 PROBLEM — E66.813 CLASS 3 SEVERE OBESITY DUE TO EXCESS CALORIES WITHOUT SERIOUS COMORBIDITY WITH BODY MASS INDEX (BMI) OF 40.0 TO 44.9 IN ADULT (HCC): Status: ACTIVE | Noted: 2022-04-20

## 2022-04-20 PROCEDURE — 99244 OFF/OP CNSLTJ NEW/EST MOD 40: CPT | Performed by: PHYSICIAN ASSISTANT

## 2022-04-20 NOTE — ASSESSMENT & PLAN NOTE
-Discussed options of Healthy[facility]-Intensive Lifestyle Intervention Program, Very Low Calorie Diet-VLCD, Conservative Program, Jose-En-Y Gastric Bypass and Vertical Sleeve Gastrectomy and the role of weight loss medications   -Initial weight loss goal of 5-10% weight loss for improved health  - Labs reviewed from 2/15/22 all within acceptable limits except glucose  - STOP BANG-4/8-referred to sleep medicine    -Patient is interested in pursuing VLCD    VLCD Review:  Contraindications: no  Labs ordered: yes-magnesium  EKG ordered: yes  HTN meds addressed: n/a  DM2 meds addressed: n/a  VLCD time restriction based on BMI: no  LMP/OCP: n/a    Discussed increasing water and veggie intake

## 2022-04-20 NOTE — PROGRESS NOTES
Assessment/Plan:    Class 3 severe obesity due to excess calories without serious comorbidity with body mass index (BMI) of 40 0 to 44 9 in [facility])  -Discussed options of Healthy[facility]-Intensive Lifestyle Intervention Program, Very Low Calorie Diet-VLCD, Conservative Program, Jose-En-Y Gastric Bypass and Vertical Sleeve Gastrectomy and the role of weight loss medications   -Initial weight loss goal of 5-10% weight loss for improved health  - Labs reviewed from 2/15/22 all within acceptable limits except glucose  - STOP BANG--referred to sleep medicine    -Patient is interested in pursuing VLCD    VLCD Review:  Contraindications: no  Labs ordered: yes-magnesium  EKG ordered: yes  HTN meds addressed: n/a  DM2 meds addressed: n/a  VLCD time restriction based on BMI: no  LMP/OCP: n/a    Discussed increasing water and veggie intake          Gastroesophageal reflux disease without esophagitis  On PRN famotidine, controlling with diet    Risk factors for obstructive sleep apnea  STOP BAN/8  - Sleep medicine referral   - will recheck in 3-4 months after weight loss has occured  -Discussed risks of untreated sleep apnea such as sudden cardiac death by arrhythmia, uncontrolled hypertension, difficulty with weight loss, decreased quality sleep, increased insulin resistance, and stroke  -Should improve with dietary and lifestyle changes          Follow up in approximately 2 weeks with Non-Surgical Dietician  Diagnoses and all orders for this visit:    Class 3 severe obesity due to excess calories without serious comorbidity with body mass index (BMI) of 40 0 to 44 9 in [facility])  -     Ambulatory Referral to Weight Management  -     Ambulatory Referral to Sleep Medicine; Future  -     Magnesium; Future  -     ECG 12 lead; Future    Gastroesophageal reflux disease without esophagitis    Risk factors for obstructive sleep apnea  -     Ambulatory Referral to Sleep Medicine;  Future          Subjective:   Chief Complaint   Patient presents with    Consult       Patient ID: Stephanie Hill  is a 27 y o  male with excess weight/obesity here to pursue weight management  He did atkins and noom before and lost some but gained back    Past Medical History:   Diagnosis Date    Allergic     Seasonal, almonds    GERD (gastroesophageal reflux disease)     Obesity        HPI:  Obesity/Excess Weight:  Severity: class III  Onset:  college    Modifiers: Diet and Exercise and Commercial Weight Loss Programs-ie  Weight Watchers, Mar Charmaine, Nutrisystem, etc  Did atikins and noom  Contributing factors: Stress/Emotional Eating and Insufficient time to make appropriate lifestyle changes  Associated symptoms: comorbid conditions and fatigue    Goals:240  Hydration:water 4-6 glasses, about 30 oz coffee with whole milk or half and half  Alcohol: 2 times a week  2-3 drinks  Exercise:none  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    Colonoscopy-Not applicable    The following portions of the patient's history were reviewed and updated as appropriate:   He  has a past medical history of Allergic, GERD (gastroesophageal reflux disease), and Obesity  He   Patient Active Problem List    Diagnosis Date Noted    Class 3 severe obesity due to excess calories without serious comorbidity with body mass index (BMI) of 40 0 to 44 9 in adult [facility]) 04/20/2022    Risk factors for obstructive sleep apnea 04/20/2022    Gastroesophageal reflux disease without esophagitis 02/10/2021     He  has a past surgical history that includes Axson tooth extraction  His family history includes ADD / ADHD in his brother and brother; Basal cell carcinoma in his mother; Cancer in his mother, paternal grandfather, and paternal uncle; Diabetes in his maternal grandfather; Hyperlipidemia in his father; Hypertension in his maternal grandfather and mother; Hypothyroidism in his mother; No Known Problems in his sister, sister, and sister; Stroke in his maternal grandfather    He  reports that he has never smoked  He has never used smokeless tobacco  He reports current alcohol use of about 6 0 - 8 0 standard drinks of alcohol per week  He reports that he does not use drugs  Current Outpatient Medications   Medication Sig Dispense Refill    famotidine (PEPCID) 20 mg tablet Take 1 tablet (20 mg total) by mouth 2 (two) times a day (Patient taking differently: Take 20 mg by mouth if needed  ) 60 tablet 5     No current facility-administered medications for this visit  Current Outpatient Medications on File Prior to Visit   Medication Sig    famotidine (PEPCID) 20 mg tablet Take 1 tablet (20 mg total) by mouth 2 (two) times a day (Patient taking differently: Take 20 mg by mouth if needed  )     No current facility-administered medications on file prior to visit  He is allergic to almond (diagnostic) - food allergy       Review of Systems   Constitutional: Positive for fatigue  HENT: Negative for sore throat  Respiratory: Negative for shortness of breath  Cardiovascular: Negative for chest pain and palpitations  Gastrointestinal: Negative for abdominal pain, constipation and diarrhea  GERD-controlled   Endocrine: Negative for cold intolerance and heat intolerance  Genitourinary: Negative for difficulty urinating  Musculoskeletal: Negative for arthralgias and back pain  Skin: Negative for rash  Neurological: Negative for headaches  Psychiatric/Behavioral: Negative for dysphoric mood  The patient is not nervous/anxious  Objective:    /82 (BP Location: Left arm, Patient Position: Sitting, Cuff Size: Standard)   Pulse 92   Temp (!) 97 4 °F (36 3 °C) (Tympanic)   Ht 5' 11 5" (1 816 m)   Wt (!) 146 kg (320 lb 14 4 oz)   BMI 44 13 kg/m²     Physical Exam  Vitals and nursing note reviewed  Constitutional:       General: He is not in acute distress  Appearance: He is well-developed  He is obese  HENT:      Head: Normocephalic and atraumatic     Eyes: Conjunctiva/sclera: Conjunctivae normal    Neck:      Thyroid: No thyromegaly  Pulmonary:      Effort: Pulmonary effort is normal  No respiratory distress  Skin:     Findings: No rash (visible)  Neurological:      Mental Status: He is alert and oriented to person, place, and time     Psychiatric:         Behavior: Behavior normal

## 2022-04-20 NOTE — ASSESSMENT & PLAN NOTE
STOP BAN/8  - Sleep medicine referral   - will recheck in 3-4 months after weight loss has occured  -Discussed risks of untreated sleep apnea such as sudden cardiac death by arrhythmia, uncontrolled hypertension, difficulty with weight loss, decreased quality sleep, increased insulin resistance, and stroke    -Should improve with dietary and lifestyle changes

## 2022-05-02 ENCOUNTER — APPOINTMENT (OUTPATIENT)
Dept: LAB | Facility: HOSPITAL | Age: 27
End: 2022-05-02
Payer: COMMERCIAL

## 2022-05-02 ENCOUNTER — OFFICE VISIT (OUTPATIENT)
Dept: LAB | Facility: HOSPITAL | Age: 27
End: 2022-05-02
Payer: COMMERCIAL

## 2022-05-02 DIAGNOSIS — R77.8 ELEVATED TOTAL PROTEIN: ICD-10-CM

## 2022-05-02 DIAGNOSIS — E66.01 CLASS 3 SEVERE OBESITY DUE TO EXCESS CALORIES WITHOUT SERIOUS COMORBIDITY WITH BODY MASS INDEX (BMI) OF 40.0 TO 44.9 IN ADULT (HCC): ICD-10-CM

## 2022-05-02 LAB
ALBUMIN SERPL BCP-MCNC: 3.9 G/DL (ref 3.5–5)
ALP SERPL-CCNC: 81 U/L (ref 46–116)
ALT SERPL W P-5'-P-CCNC: 59 U/L (ref 12–78)
ANION GAP SERPL CALCULATED.3IONS-SCNC: 6 MMOL/L (ref 4–13)
AST SERPL W P-5'-P-CCNC: 25 U/L (ref 5–45)
BILIRUB SERPL-MCNC: 0.61 MG/DL (ref 0.2–1)
BUN SERPL-MCNC: 9 MG/DL (ref 5–25)
CALCIUM SERPL-MCNC: 9.8 MG/DL (ref 8.3–10.1)
CHLORIDE SERPL-SCNC: 106 MMOL/L (ref 100–108)
CO2 SERPL-SCNC: 26 MMOL/L (ref 21–32)
CREAT SERPL-MCNC: 0.9 MG/DL (ref 0.6–1.3)
GFR SERPL CREATININE-BSD FRML MDRD: 115 ML/MIN/1.73SQ M
GLUCOSE SERPL-MCNC: 97 MG/DL (ref 65–140)
MAGNESIUM SERPL-MCNC: 2.1 MG/DL (ref 1.6–2.6)
POTASSIUM SERPL-SCNC: 3.9 MMOL/L (ref 3.5–5.3)
PROT SERPL-MCNC: 7.8 G/DL (ref 6.4–8.2)
SODIUM SERPL-SCNC: 138 MMOL/L (ref 136–145)

## 2022-05-02 PROCEDURE — 83735 ASSAY OF MAGNESIUM: CPT

## 2022-05-02 PROCEDURE — 80053 COMPREHEN METABOLIC PANEL: CPT

## 2022-05-02 PROCEDURE — 36415 COLL VENOUS BLD VENIPUNCTURE: CPT

## 2022-05-02 PROCEDURE — 93005 ELECTROCARDIOGRAM TRACING: CPT

## 2022-05-03 LAB
ATRIAL RATE: 79 BPM
P AXIS: 32 DEGREES
PR INTERVAL: 132 MS
QRS AXIS: 15 DEGREES
QRSD INTERVAL: 88 MS
QT INTERVAL: 370 MS
QTC INTERVAL: 424 MS
T WAVE AXIS: 11 DEGREES
VENTRICULAR RATE: 79 BPM

## 2022-05-03 PROCEDURE — 93010 ELECTROCARDIOGRAM REPORT: CPT | Performed by: INTERNAL MEDICINE

## 2022-05-17 ENCOUNTER — OFFICE VISIT (OUTPATIENT)
Dept: FAMILY MEDICINE CLINIC | Facility: CLINIC | Age: 27
End: 2022-05-17
Payer: COMMERCIAL

## 2022-05-17 VITALS
HEART RATE: 93 BPM | WEIGHT: 315 LBS | TEMPERATURE: 97.8 F | RESPIRATION RATE: 16 BRPM | BODY MASS INDEX: 42.66 KG/M2 | HEIGHT: 72 IN | DIASTOLIC BLOOD PRESSURE: 88 MMHG | OXYGEN SATURATION: 97 % | SYSTOLIC BLOOD PRESSURE: 140 MMHG

## 2022-05-17 DIAGNOSIS — I10 ESSENTIAL HYPERTENSION: Primary | ICD-10-CM

## 2022-05-17 PROCEDURE — 99213 OFFICE O/P EST LOW 20 MIN: CPT | Performed by: FAMILY MEDICINE

## 2022-05-17 NOTE — PATIENT INSTRUCTIONS
Obtain cuff and check blood pressure's daily  If running consistently in the 140s, lmk  Continue on with weight mgmt  Back in early august

## 2022-05-17 NOTE — PROGRESS NOTES
Assessment/Plan:    No problem-specific Assessment & Plan notes found for this encounter  Diagnoses and all orders for this visit:    Essential hypertension  Comments:  just meets criteria for HTN, and he has tried dietary modification  Would start meds at this time, but pt is soon to begin with weight mgmt which may be all the intervention he needs  For now, go ahead with weight mgmt, check home blood pressure daily and record (call if running >140 consistently) and f/u in 2 mo once he has been on the diet for a while  Subjective:      Patient ID: Stephanie Hill is a 27 y o  male  HPI  Pt presents for f/u on blood pressure  Has been checking at home and it has been in 120s-140  Has changed diet--limiting etoh/sugar  Doing weight mgmt starting in June--will start with keto/low jim   No chest pain, shortness of breath, n/v, abd pain, visual changes, paresthesias, weakness  The following portions of the patient's history were reviewed and updated as appropriate: allergies, current medications, past family history, past medical history, past social history, past surgical history and problem list     Review of Systems    See hpi    Objective:      /88   Pulse 93   Temp 97 8 °F (36 6 °C)   Resp 16   Ht 5' 11 5" (1 816 m)   Wt (!) 145 kg (318 lb 9 6 oz)   SpO2 97%   BMI 43 82 kg/m²          Physical Exam  Constitutional:       Appearance: Normal appearance  HENT:      Head: Normocephalic  Cardiovascular:      Rate and Rhythm: Normal rate and regular rhythm  Heart sounds: No murmur heard  No friction rub  No gallop  Pulmonary:      Breath sounds: Normal breath sounds  No wheezing, rhonchi or rales  Neurological:      General: No focal deficit present  Mental Status: He is alert and oriented to person, place, and time  Cranial Nerves: No cranial nerve deficit

## 2022-05-31 NOTE — PROGRESS NOTES
Initial RD session for VLCD completed  Components of diet discussed including ketosis, hydration, possible side effects  2 weeks of product ordered and received  Will f/u 6/15 via email

## 2022-06-06 ENCOUNTER — OFFICE VISIT (OUTPATIENT)
Dept: BARIATRICS | Facility: CLINIC | Age: 27
End: 2022-06-06

## 2022-06-06 VITALS — BODY MASS INDEX: 42.66 KG/M2 | HEIGHT: 72 IN | WEIGHT: 315 LBS

## 2022-06-06 DIAGNOSIS — R63.5 ABNORMAL WEIGHT GAIN: ICD-10-CM

## 2022-06-06 PROCEDURE — RECHECK

## 2022-06-06 PROCEDURE — VLCD

## 2022-06-15 ENCOUNTER — PATIENT OUTREACH (OUTPATIENT)
Dept: BARIATRICS | Facility: CLINIC | Age: 27
End: 2022-06-15

## 2022-06-15 NOTE — PROGRESS NOTES
Reply received from Ramses Kim re: VLCD  Doing well, some headaches yesterday but better with more hydration  He will reach out with any concerns as needed

## 2022-06-17 NOTE — PROGRESS NOTES
Weight Management Medical Nutrition Assessment   Is here for VLCD f/u  Current wt: 305 6 lbs  Loss of 16 9 lbs x 2 weeks  In the first week used Senna as needed but now more regular  No adverse effects noted  Hydration adequate  Used snacks initially but now less frequently  Will continue VLCD at this time  Patient seen by Medical Provider in past 6 months:  yes  Requested to schedule appointment with Medical Provider: No    Anthropometric Measurements  Start Weight (#): 322 5 lbs 6/6/22  Current Weight (#): 305 6 lbs  TBW % Change from start weight: 5 2%  Ideal Body Weight (#):182 6 lbs BMI 25 (5'11 5")  Goal Weight (#): 240 lbs     Weight Loss History  Previous weight loss attempts: Exercise  Self Created Diets (Portion Control, Healthy Food Choices, etc )    Food and Nutrition Related History  Wake up: 4:45  Bed Time: 9:30    Food Recall  Breakfast: 8:00: replacement     Snack:   Lunch: 1:00 p m  replacement  Snack: 3-3:30 bar   Dinner: 6:30: replacement   Snack:     Beverages: water  Volume of beverage intake: 100-120 oz    Weekends: Same  Cravings: not identified   Trouble area of day: not identified    Frequency of Eating out: none currently  Food restrictions: almonds; carbs <50 gm while on VLCD   Cooking: self   Food Shopping: self    Physical Activity Intake  Activity:none currently  Frequency:n/a  Physical limitations/barriers to exercise: no intense exercise while on VLCD     Estimated Needs  Energy  [place] Energy Needs: BMR : 2381   1-2# loss weekly sedentary:  3547-3594             1-2# loss weekly lightly active: 5012-8774  Maintenance calories for sedentary activity level: 2857  Protein:  gm     (1 2-1 5g/kg IBW)  Fluid: 112 oz     (35mL/kg adjusted IBW)    Nutrition Diagnosis  Yes;     Overweight/obesity  related to Excess energy intake as evidenced by  BMI more than normative standard for age and sex (obesity-grade III 36+)       Nutrition Intervention    Nutrition Prescription  Calories: 760  Protein: 96 gm  Fluid: 80 oz    Meal Plan (John/Pro/Carb)  Breakfast: 200, 27, 10  Snack:  Lunch: 200, 27, 10  Snack: 160, 15, 13 net  Dinner: 200, 27, 10  Snack:    Nutrition Education:    VLCD  Physical activity     Nutrition Counseling:  Strategies: as above     Monitoring and Evaluation:  Evaluation criteria:  Energy Intake  Meet protein needs  Maintain adequate hydration  Monitor weekly weight  Physical activity     Barriers to learning:none  Readiness to change: Action:  (Changing behavior)  Comprehension: very good  Expected Compliance: very good

## 2022-06-27 ENCOUNTER — OFFICE VISIT (OUTPATIENT)
Dept: BARIATRICS | Facility: CLINIC | Age: 27
End: 2022-06-27

## 2022-06-27 VITALS
BODY MASS INDEX: 41.39 KG/M2 | DIASTOLIC BLOOD PRESSURE: 84 MMHG | HEIGHT: 72 IN | WEIGHT: 305.6 LBS | SYSTOLIC BLOOD PRESSURE: 132 MMHG

## 2022-06-27 DIAGNOSIS — R63.5 ABNORMAL WEIGHT GAIN: Primary | ICD-10-CM

## 2022-06-27 PROCEDURE — VLCD

## 2022-06-27 PROCEDURE — RECHECK

## 2022-07-01 ENCOUNTER — APPOINTMENT (OUTPATIENT)
Dept: LAB | Facility: HOSPITAL | Age: 27
End: 2022-07-01
Payer: COMMERCIAL

## 2022-07-01 ENCOUNTER — TELEPHONE (OUTPATIENT)
Dept: BARIATRICS | Facility: CLINIC | Age: 27
End: 2022-07-01

## 2022-07-01 DIAGNOSIS — R63.5 ABNORMAL WEIGHT GAIN: ICD-10-CM

## 2022-07-01 LAB
ALBUMIN SERPL BCP-MCNC: 4.1 G/DL (ref 3.5–5)
ALP SERPL-CCNC: 86 U/L (ref 46–116)
ALT SERPL W P-5'-P-CCNC: 79 U/L (ref 12–78)
ANION GAP SERPL CALCULATED.3IONS-SCNC: 7 MMOL/L (ref 4–13)
AST SERPL W P-5'-P-CCNC: 34 U/L (ref 5–45)
BILIRUB SERPL-MCNC: 0.51 MG/DL (ref 0.2–1)
BUN SERPL-MCNC: 12 MG/DL (ref 5–25)
CALCIUM SERPL-MCNC: 9.8 MG/DL (ref 8.3–10.1)
CHLORIDE SERPL-SCNC: 105 MMOL/L (ref 100–108)
CO2 SERPL-SCNC: 27 MMOL/L (ref 21–32)
CREAT SERPL-MCNC: 0.78 MG/DL (ref 0.6–1.3)
GFR SERPL CREATININE-BSD FRML MDRD: 121 ML/MIN/1.73SQ M
GLUCOSE SERPL-MCNC: 87 MG/DL (ref 65–140)
MAGNESIUM SERPL-MCNC: 2.2 MG/DL (ref 1.6–2.6)
POTASSIUM SERPL-SCNC: 4 MMOL/L (ref 3.5–5.3)
PROT SERPL-MCNC: 8.4 G/DL (ref 6.4–8.2)
SODIUM SERPL-SCNC: 139 MMOL/L (ref 136–145)

## 2022-07-01 PROCEDURE — 83735 ASSAY OF MAGNESIUM: CPT

## 2022-07-01 PROCEDURE — 36415 COLL VENOUS BLD VENIPUNCTURE: CPT

## 2022-07-01 PROCEDURE — 80053 COMPREHEN METABOLIC PANEL: CPT

## 2022-07-01 NOTE — TELEPHONE ENCOUNTER
----- Message from Anjel Myers DO sent at 7/1/2022 12:43 PM EDT -----  Labs reviewed  Mild elevation in ALT which is one of patient's liver enzymes which can be seen with very low-calorie diet  Otherwise labs unremarkable  Can continue with very low-calorie diet  Liver enzymes will be monitored with next set of labs that will be ordered by the dietitian as well  Message sent to [employer] as well as an [facility] [place]

## 2022-07-14 ENCOUNTER — OFFICE VISIT (OUTPATIENT)
Dept: BARIATRICS | Facility: CLINIC | Age: 27
End: 2022-07-14
Payer: COMMERCIAL

## 2022-07-14 VITALS
SYSTOLIC BLOOD PRESSURE: 120 MMHG | BODY MASS INDEX: 39.47 KG/M2 | HEIGHT: 72 IN | WEIGHT: 291.4 LBS | DIASTOLIC BLOOD PRESSURE: 84 MMHG

## 2022-07-14 DIAGNOSIS — R63.5 ABNORMAL WEIGHT GAIN: Primary | ICD-10-CM

## 2022-07-14 PROCEDURE — S9470 NUTRITIONAL COUNSELING, DIET: HCPCS | Performed by: DIETITIAN, REGISTERED

## 2022-07-14 PROCEDURE — RECHECK: Performed by: DIETITIAN, REGISTERED

## 2022-07-14 NOTE — PROGRESS NOTES
Weight Management Medical Nutrition Assessment   Is here for VLCD f/u (4 week)  Current wt: 291 4 lbs  Loss of 14 2 lbs x 2 weeks, overall 31 1# lost (averaging 7 8# per week)  No adverse effects noted  Hydration adequate  Using snack to compensate is outside doing yard work or if has an evening craving (turkey pepperoni or cheese stick)  Wants to add gochujang to soups on occasion (1 tsp, <1g CHO)  Will continue VLCD at this time  Patient seen by Medical Provider in past 6 months:  yes  Requested to schedule appointment with Medical Provider: No    Anthropometric Measurements  Start Weight (#): 322 5 lbs 6/6/22  Current Weight (#): 291 4lbs  TBW % Change from start weight: 9 6  Ideal Body Weight (#):182 6 lbs BMI 25 (5'11 5")  Goal Weight (#): 240 lbs     Weight Loss History  Previous weight loss attempts: Exercise  Self Created Diets (Portion Control, Healthy Food Choices, etc )    Food and Nutrition Related History  Wake up: 4:45  Bed Time: 9:30    Food Recall  Breakfast: 8:00: replacement     Snack:   Lunch: 1:00 p m  replacement  Snack: 3-3:30 bar   Dinner: 6:30: replacement   Snack:     Beverages: water, coffee (black)  Volume of beverage intake: 100-120 oz, 20 oz coffee    Weekends: Same  Cravings: not identified   Trouble area of day: not identified    Frequency of Eating out: none currently  Food restrictions: almonds; carbs <50 gm while on VLCD   Cooking: self   Food Shopping: self    Physical Activity Intake-updates in bold  Activity:none currently yard work or walking  For 30 minutes 2x/week  Frequency:n/a  Physical limitations/barriers to exercise: no intense exercise while on VLCD     Estimated Needs  Energy  [place] Energy Needs:  BMR : 2381   1-2# loss weekly sedentary:  5104-2780             1-2# loss weekly lightly active: 5181-4811  Maintenance calories for sedentary activity level: 2857  Protein:  gm     (1 2-1 5g/kg IBW)  Fluid: 112 oz     (35mL/kg adjusted IBW)    Nutrition Diagnosis  Yes;     Overweight/obesity  related to Excess energy intake as evidenced by  BMI more than normative standard for age and sex (obesity-grade III 36+)       Nutrition Intervention    Nutrition Prescription  Calories: 760  Protein: 96 gm  Fluid: 80 oz    Meal Plan (John/Pro/Carb)  Breakfast: 200, 27, 10  Snack:  Lunch: 200, 27, 10  Snack: 160, 15, 13 net  Dinner: 200, 27, 10  Snack:    Nutrition Education:    VLCD  Physical activity     Nutrition Counseling:  Strategies: as above     Monitoring and Evaluation:  Evaluation criteria:  Energy Intake  Meet protein needs  Maintain adequate hydration  Monitor weekly weight  Physical activity     Barriers to learning:none  Readiness to change: Action:  (Changing behavior)  Comprehension: very good  Expected Compliance: very good

## 2022-07-27 ENCOUNTER — OFFICE VISIT (OUTPATIENT)
Dept: BARIATRICS | Facility: CLINIC | Age: 27
End: 2022-07-27
Payer: COMMERCIAL

## 2022-07-27 VITALS
BODY MASS INDEX: 39.38 KG/M2 | OXYGEN SATURATION: 96 % | DIASTOLIC BLOOD PRESSURE: 82 MMHG | HEART RATE: 90 BPM | WEIGHT: 281.3 LBS | TEMPERATURE: 97.8 F | HEIGHT: 71 IN | SYSTOLIC BLOOD PRESSURE: 106 MMHG

## 2022-07-27 DIAGNOSIS — E66.9 OBESITY, CLASS II, BMI 35-39.9: Primary | ICD-10-CM

## 2022-07-27 DIAGNOSIS — K21.9 GASTROESOPHAGEAL REFLUX DISEASE WITHOUT ESOPHAGITIS: ICD-10-CM

## 2022-07-27 PROBLEM — E66.812 OBESITY, CLASS II, BMI 35-39.9: Status: ACTIVE | Noted: 2022-04-20

## 2022-07-27 PROCEDURE — 99213 OFFICE O/P EST LOW 20 MIN: CPT | Performed by: NURSE PRACTITIONER

## 2022-07-27 NOTE — ASSESSMENT & PLAN NOTE
- Patient is pursuing Very Low Calorie Diet-VLCD  - Initial weight loss goal of 5-10% weight loss for improved health  - Doing well with VLCD  - Check CMP and magnesium      Initial VLCD start: 322 5 lbs  Current: 281 3 lbs  Change: -41 2 lbs  Goal: 240 lbs    Goals:  Continue VLCD  Keep up the great water intake  Follow-up with dietician as planned

## 2022-07-27 NOTE — ASSESSMENT & PLAN NOTE
- Taking pepcid as needed  May improve with weight loss and lifestyle modification  Continue management with prescribing provider

## 2022-07-27 NOTE — PROGRESS NOTES
Assessment/Plan:     Obesity, Class II, BMI 35-39 9  - Patient is pursuing Very Low Calorie Diet-VLCD  - Initial weight loss goal of 5-10% weight loss for improved health  - Doing well with VLCD  - Check CMP and magnesium  Initial VLCD start: 322 5 lbs  Current: 281 3 lbs  Change: -41 2 lbs  Goal: 240 lbs    Goals:  Continue VLCD  Keep up the great water intake  Follow-up with dietician as planned    Gastroesophageal reflux disease without esophagitis  - Taking pepcid as needed  May improve with weight loss and lifestyle modification  Continue management with prescribing provider  Stacy Solis was seen today for follow-up  Diagnoses and all orders for this visit:    Obesity, Class II, BMI 35-39 9  -     Comprehensive metabolic panel; Future  -     Magnesium; Future    Gastroesophageal reflux disease without esophagitis            Follow up in approximately 2 weeks with Non-Surgical Dietician  Subjective:   Chief Complaint   Patient presents with    Follow-up     MWM 6 wk fu       Patient ID: Chen Goldsmith  is a 32 y o  male with excess weight/obesity here to pursue weight management  Patient is pursuing Very Low Calorie Diet-VLCD  Most recent notes and records were reviewed  HPI    Wt Readings from Last 10 Encounters:   07/27/22 128 kg (281 lb 4 8 oz)   07/14/22 132 kg (291 lb 6 4 oz)   06/27/22 (!) 139 kg (305 lb 9 6 oz)   06/06/22 (!) 146 kg (322 lb 8 oz)   05/17/22 (!) 145 kg (318 lb 9 6 oz)   04/20/22 (!) 146 kg (320 lb 14 4 oz)   02/15/22 (!) 142 kg (312 lb 3 2 oz)   02/26/21 125 kg (275 lb)   02/10/21 127 kg (280 lb 9 6 oz)   02/07/20 124 kg (274 lb 3 2 oz)       Tolerating VLCD well  No emergency meals needed  Snacks as needed for walking or working outside       Hydration- 100 oz water daily  Alcohol- none  Exercise- light walking    Colonoscopy:N/A      The following portions of the patient's history were reviewed and updated as appropriate: allergies, current medications, past family history, past medical history, past social history, past surgical history, and problem list     Review of Systems   HENT: Negative for sore throat  Respiratory: Negative for cough and shortness of breath  Cardiovascular: Negative for chest pain and palpitations  Gastrointestinal: Negative for abdominal pain, constipation, diarrhea, nausea and vomiting  Denies GERD  Musculoskeletal: Negative for arthralgias and back pain  Skin: Negative for rash  Psychiatric/Behavioral: Negative for suicidal ideas  Denies anxiety and depression       Objective:  /82   Pulse 90   Temp 97 8 °F (36 6 °C) (Tympanic)   Ht 5' 11" (1 803 m)   Wt 128 kg (281 lb 4 8 oz)   SpO2 96%   BMI 39 23 kg/m²     Physical Exam  Vitals and nursing note reviewed  Constitutional   General appearance: Abnormal   well developed and obese  Eyes No conjunctival injection  Ears, Nose, Mouth, and Throat Oral mucosa moist    Pulmonary   Respiratory effort: No increased work of breathing or signs of respiratory distress  Cardiovascular     Examination of extremities for edema and/or varicosities: Normal   no edema  Abdomen   Abdomen: Abnormal   The abdomen was obese      Musculoskeletal   Gait and station: Normal     Psychiatric   Orientation to person, place and time: Normal     Affect: appropriate

## 2022-08-01 ENCOUNTER — OFFICE VISIT (OUTPATIENT)
Dept: SLEEP CENTER | Facility: CLINIC | Age: 27
End: 2022-08-01
Payer: COMMERCIAL

## 2022-08-01 VITALS
SYSTOLIC BLOOD PRESSURE: 131 MMHG | BODY MASS INDEX: 39.76 KG/M2 | HEIGHT: 71 IN | DIASTOLIC BLOOD PRESSURE: 74 MMHG | WEIGHT: 284 LBS | HEART RATE: 67 BPM

## 2022-08-01 DIAGNOSIS — E66.9 OBESITY, CLASS II, BMI 35-39.9: ICD-10-CM

## 2022-08-01 DIAGNOSIS — E66.01 CLASS 3 SEVERE OBESITY DUE TO EXCESS CALORIES WITHOUT SERIOUS COMORBIDITY WITH BODY MASS INDEX (BMI) OF 40.0 TO 44.9 IN ADULT (HCC): ICD-10-CM

## 2022-08-01 DIAGNOSIS — G47.33 OSA (OBSTRUCTIVE SLEEP APNEA): Primary | ICD-10-CM

## 2022-08-01 DIAGNOSIS — Z91.89 RISK FACTORS FOR OBSTRUCTIVE SLEEP APNEA: ICD-10-CM

## 2022-08-01 PROBLEM — E66.813 CLASS 3 SEVERE OBESITY DUE TO EXCESS CALORIES WITHOUT SERIOUS COMORBIDITY WITH BODY MASS INDEX (BMI) OF 40.0 TO 44.9 IN ADULT (HCC): Status: ACTIVE | Noted: 2022-08-01

## 2022-08-01 PROCEDURE — 99244 OFF/OP CNSLTJ NEW/EST MOD 40: CPT | Performed by: INTERNAL MEDICINE

## 2022-08-01 NOTE — PATIENT INSTRUCTIONS
Sleep Apnea   AMBULATORY CARE:   Sleep apnea  is a condition that causes you to stop breathing often during sleep  Types of sleep apnea:   Obstructive sleep apnea (LAZARA)  is the most common kind  The muscles and tissues around your throat relax and block air from passing through  Obesity, use of alcohol or cigarettes, or a family history are common causes  LAZARA may increase your risk for complications after surgery  Central sleep apnea (CSA)  means your brain does not send signals to the muscles that control breathing  You do not take a breath even though your airway is open  Common causes include medical conditions such as heart failure, being older than 40, or use of opioids  Complex (or mixed) sleep apnea  means you have both obstructive and central sleep apnea  Common signs and symptoms:   Loud snoring or long pauses in breathing    Feeling sleepy, slow, and tired during the day    Snorting, gasping, or choking while you sleep, and waking up suddenly because of these    Feeling irritable during the day    Dry mouth or a headache in the mornings    Heavy night sweating    A hard time thinking, remembering things, or focusing on your tasks the following day    Call your local emergency number (911 in the [address] [facility]) if:   You have chest pain or trouble breathing  Call your doctor if:   You have new or worsening signs or symptoms  You have questions or concerns about your condition or care  Treatment  depends on the kind of apnea you have  A mouth device  may be needed if you have mild sleep apnea  These are designed to keep your throat open  Ask your dentist or healthcare provider about the best mouth device for you  A machine  may be used to help you get more air during sleep  A mask may be placed over your nose and mouth, or just your nose  The mask is hooked to the machine  You will get air through the mask      A continuous positive airway pressure (CPAP) machine  is used to keep your airway open during sleep  The machine blows a gentle stream of air into the mask when you breathe  This helps keep your airway open so you can breathe more regularly  Extra oxygen may be given through the machine  A bilevel positive airway pressure (BiPAP) machine  gives air but lowers the pressure when you breathe out  An adaptive servo-ventilator (ASV)  is a machine that learns your usual breathing pattern  Then, it uses pressure to give you air and prevent stops in your breathing  Surgery  to expand your airway or remove extra tissues may be needed  Surgery is usually only considered if other treatments do not work  Manage or prevent sleep apnea:   Reach and maintain a healthy weight  Ask your healthcare provider what a healthy weight is for you  Ask him or her to help you create a safe weight loss plan if you are overweight  Even a small goal of a 10% weight loss can improve your symptoms  Do not smoke  Nicotine and other chemicals in cigarettes and cigars can cause lung damage  Ask your healthcare provider for information if you currently smoke and need help to quit  E-cigarettes or smokeless tobacco still contain nicotine  Talk to your healthcare provider before you use these products  Do not drink alcohol or take sedative medicine before you go to sleep  Alcohol and sedatives can relax the muscles and tissues around your throat  This can block the airflow to your lungs  Sleep on your side or use pillows designed to prevent sleep apnea  This prevents your tongue or other tissues from blocking your throat  You can also raise the head of your bed  Follow up with your doctor or specialist as directed: You may need to have blood tests during your follow-up visits  Work with your provider to find the right breathing support equipment and settings for you  Write down your questions so you remember to ask them during your visits    © Copyright [employer] 2022 Information is for End User's use only and may not be sold, redistributed or otherwise used for commercial purposes  All illustrations and images included in CareNotes® are the copyrighted property of A D A M , Inc  or Karrie Warren  The above information is an  only  It is not intended as medical advice for individual conditions or treatments  Talk to your doctor, nurse or pharmacist before following any medical regimen to see if it is safe and effective for you

## 2022-08-01 NOTE — PROGRESS NOTES
Sleep Consultation   Radha Aguayo 32 y o  male MRN: [id number]      Reason for consultation: LAZARA    Requesting physician: Dr James Bates and Dr Letty Taylor     Assessment/Plan  1  LAZARA (obstructive sleep apnea)  Assessment & Plan:  · Korina Tyler has a high pretest probability for obstructive apnea given history of snoring, BMI of 39 61, excessive daytime sleepiness  I ordered a home sleep study    Orders:  -     Home Study; Future    2  Class 3 severe obesity due to excess calories without serious comorbidity with body mass index (BMI) of 40 0 to 44 9 in [facility])  -     Ambulatory Referral to Sleep Medicine  -     Home Study; Future    3  Risk factors for obstructive sleep apnea  Assessment & Plan:  Ordered a home study test as detailed    Orders:  -     Ambulatory Referral to Sleep Medicine  -     Home Study; Future    4  Obesity, Class II, BMI 35-39 9  Assessment & Plan:  BMI of 39 61, Korina Tyler has lost significant weight with following with the weight management program is doing very well down from 322 lb to 284 today            History of Present Illness   HPI:  Radha Aguayo is a 32 y o  male with PMHx as below who comes in for evaluation of possible obstructive sleep apnea  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 [employer] and he feels tired during the day he has gained about 30 lb during the pandemic and for that he started to notice that he snores more loudly and he feels more tired during the day he started to [facility] for conservative dieting and weight loss however he answered high points on stop Bang given his history of snoring being tired, noted couple of episodes of high blood pressure and his BMI subsequently he was referred to us  His sleep routine is going to bed around 9:00 p m  Falls asleep in 1 hour wakes up at 4:30 a m  He has 1 awakening overnight and he feels sleepy during the day usually early morning late afternoon  He snores loudly and snorts while his sleep    He has infrequent sleep paralysis episodes  He drinks 24-32 oz of coffee, denies alcohol or tobacco or drug abuse  His Hamburg Scale is 3/24  Review of Systems      Genitourinary none   Cardiology none   Gastrointestinal none   Neurology none   Constitutional fatigue   Integumentary none   Psychiatry none   Musculoskeletal leg cramps   Pulmonary snoring   ENT none   Endocrine none   Hematological none           Historical Information   Past Medical History:   Diagnosis Date    Allergic     Seasonal, almonds    GERD (gastroesophageal reflux disease)     Obesity      Past Surgical History:   Procedure Laterality Date    WISDOM TOOTH EXTRACTION       Family History   Problem Relation Age of Onset    Cancer Paternal Grandfather         Metastatic prostate cancer    Cancer Paternal Uncle         Prostate cancer with prostatectomy    Hyperlipidemia Father     ADD / ADHD Brother    Lisa Lee ADD / ADHD Brother     Hypertension Mother     Hypothyroidism Mother     Basal cell carcinoma Mother     Cancer Mother         Renal cell carcinoma    No Known Problems Sister     No Known Problems Sister     No Known Problems Sister     Diabetes Maternal Grandfather     Hypertension Maternal Grandfather     Stroke Maternal Grandfather      Social History     Socioeconomic History    Marital status: Single     Spouse name: Not on file    Number of children: Not on file    Years of education: Not on file    Highest education level: Not on file   Occupational History    Not on file   Tobacco Use    Smoking status: Never Smoker    Smokeless tobacco: Never Used   Vaping Use    Vaping Use: Never used   Substance and Sexual Activity    Alcohol use:  Yes     Alcohol/week: 6 0 - 8 0 standard drinks     Types: 1 - 2 Glasses of wine, 1 - 2 Cans of beer, 4 Standard drinks or equivalent per week    Drug use: Never    Sexual activity: Not Currently     Partners: Female     Birth control/protection: Condom Male   Other Topics Concern  Not on file   Social History Narrative    Single    No pets/children    PA - at [employer]      Social Determinants of Health     Financial Resource Strain: Not on file   Food Insecurity: Not on file   Transportation Needs: Not on file   Physical Activity: Not on file   Stress: Not on file   Social Connections: Not on file   Intimate Partner Violence: Not on file   Housing Stability: Not on file       Occupational History: [employer]    Meds/Allergies   Allergies   Allergen Reactions    [facility] (Diagnostic) - Food Allergy Angioedema       Home medications:  Prior to Admission medications    Medication Sig Start Date End Date Taking? Authorizing Provider   famotidine (PEPCID) 20 mg tablet Take 1 tablet (20 mg total) by mouth 2 (two) times a day 11/12/20  Yes Shubham Drilling, DO       Vitals:   Blood pressure 131/74, pulse 67, height 5' 11" (1 803 m), weight 129 kg (284 lb)  ,  Body mass index is 39 61 kg/m²  Neck Circumference: 16 5    Physical Exam  General:  Awake alert and oriented x 3, conversant without conversational dyspnea, NAD, normal affect  HEENT:   Sclera noninjected, nonicteric OU, Nares patent,  no craniofacial abnormalities, Mucous membranes, moist, no oral lesions, normal dentition, Mallampati class 2  NECK:  Trachea midline, no accessory muscle use, no stridor,  JVP not elevated  CARDIAC: Reg, single s1/S2, no m/r/g  PULM: CTA bilaterally no wheezing, rhonchi or rales  ABD: Soft nontender, nondistended, no rebound, no rigidity, no guarding  EXT: No cyanosis, no clubbing, no edema, normal capillary refill  NEURO: no focal neurologic deficits, AAOx3, moving all extremities appropriately    Labs: I have personally reviewed pertinent lab results  , ABG: No results found for: PHART, HSZ0VBB, PO2ART, NWT7PTD, E4QUYKRJ, BEART, SOURCE, BNP: No results found for: BNP, CBC: No results found for: WBC, HGB, HCT, MCV, PLT, ADJUSTEDWBC, MCH, MCHC, RDW, MPV, NRBC, CMP: No results found for: SODIUM, K, CL, CO2, ANIONGAP, BUN, CREATININE, GLUCOSE, CALCIUM, AST, ALT, ALKPHOS, PROT, BILITOT, EGFR, PT/INR: No results found for: PT, INR, Troponin: No results found for: TROPONINI  Lab Results   Component Value Date    WBC 5 33 02/15/2022    HGB 15 9 02/15/2022    HCT 46 0 02/15/2022    MCV 91 02/15/2022     02/15/2022      Lab Results   Component Value Date    CALCIUM 9 8 07/01/2022    K 4 0 07/01/2022    CO2 27 07/01/2022     07/01/2022    BUN 12 07/01/2022    CREATININE 0 78 07/01/2022     No results found for: IRON, TIBC, FERRITIN  No results found for: [id number]  No results found for: Sondra Loera MD  [facility]       Portions of the record may have been created with voice recognition software  Occasional wrong word or "sound a like" substitutions may have occurred due to the inherent limitations of voice recognition software  Read the chart carefully and recognize, using context, where substitutions have occurred

## 2022-08-01 NOTE — PROGRESS NOTES
Review of Systems      Genitourinary none   Cardiology none   Gastrointestinal none   Neurology none   Constitutional fatigue   Integumentary none   Psychiatry none   Musculoskeletal leg cramps   Pulmonary snoring   ENT none   Endocrine none   Hematological none

## 2022-08-01 NOTE — ASSESSMENT & PLAN NOTE
· Naina Esaclera has a high pretest probability for obstructive apnea given history of snoring, BMI of 39 61, excessive daytime sleepiness    I ordered a home sleep study

## 2022-08-01 NOTE — ASSESSMENT & PLAN NOTE
BMI of 39 61, Keira Samson has lost significant weight with following with the weight management program is doing very well down from 322 lb to 284 today

## 2022-08-07 ENCOUNTER — APPOINTMENT (OUTPATIENT)
Dept: LAB | Facility: HOSPITAL | Age: 27
End: 2022-08-07
Payer: COMMERCIAL

## 2022-08-07 DIAGNOSIS — E66.9 OBESITY, CLASS II, BMI 35-39.9: ICD-10-CM

## 2022-08-07 LAB
ALBUMIN SERPL BCP-MCNC: 3.9 G/DL (ref 3.5–5)
ALP SERPL-CCNC: 97 U/L (ref 46–116)
ALT SERPL W P-5'-P-CCNC: 45 U/L (ref 12–78)
ANION GAP SERPL CALCULATED.3IONS-SCNC: 5 MMOL/L (ref 4–13)
AST SERPL W P-5'-P-CCNC: 19 U/L (ref 5–45)
BILIRUB SERPL-MCNC: 0.68 MG/DL (ref 0.2–1)
BUN SERPL-MCNC: 12 MG/DL (ref 5–25)
CALCIUM SERPL-MCNC: 9.1 MG/DL (ref 8.3–10.1)
CHLORIDE SERPL-SCNC: 106 MMOL/L (ref 96–108)
CO2 SERPL-SCNC: 27 MMOL/L (ref 21–32)
CREAT SERPL-MCNC: 0.77 MG/DL (ref 0.6–1.3)
GFR SERPL CREATININE-BSD FRML MDRD: 122 ML/MIN/1.73SQ M
GLUCOSE SERPL-MCNC: 92 MG/DL (ref 65–140)
MAGNESIUM SERPL-MCNC: 2.1 MG/DL (ref 1.6–2.6)
POTASSIUM SERPL-SCNC: 3.7 MMOL/L (ref 3.5–5.3)
PROT SERPL-MCNC: 7.4 G/DL (ref 6.4–8.4)
SODIUM SERPL-SCNC: 138 MMOL/L (ref 135–147)

## 2022-08-07 PROCEDURE — 36415 COLL VENOUS BLD VENIPUNCTURE: CPT

## 2022-08-07 PROCEDURE — 83735 ASSAY OF MAGNESIUM: CPT

## 2022-08-07 PROCEDURE — 80053 COMPREHEN METABOLIC PANEL: CPT

## 2022-08-07 NOTE — PROGRESS NOTES
Weight Management Medical Nutrition Assessment   Is here for VLCD f/u (8 week)  Current wt: 272 4 lbs  Loss of 8 9  lbs x 2 weeks, overall loss of 50 1 lbs x 8 wks (avg 6 3 lbs/wk)  Some hunger the last 2 weeks, not quite sure if physical vs hydration vs stress  Will use snacks at those times (turkey pepperoni, cheese)  Anticipates finishing 12 wks of VLCD  Will reviewed transition plan at next visit  Hydration adequate most of the time  Labs WNL  Will continue VLCD at this time  Patient seen by Medical Provider in past 6 months:  yes  Requested to schedule appointment with Medical Provider: No    Anthropometric Measurements  Start Weight (#): 322 5 lbs 6/6/22  Current Weight (#): 272 4 lbs  TBW % Change from start weight: 15 5%  Ideal Body Weight (#):182 6 lbs BMI 25 (5'11 5" 175 lbs)  Goal Weight (#): 240 lbs     Weight Loss History  Previous weight loss attempts: Exercise  Self Created Diets (Portion Control, Healthy Food Choices, etc )    Food and Nutrition Related History  Wake up: 4:45  Bed Time: 9:30    Food Recall  Breakfast: 8:00: replacement     Snack:   Lunch: 1:00 p m  replacement  Snack: 3-3:30 bar   Dinner: 6:30: replacement   Snack:     Beverages: water, coffee (black)  Volume of beverage intake: 100-120 oz, 20 oz coffee    Weekends: Same  Cravings: not identified   Trouble area of day: not identified    Frequency of Eating out: none currently  Food restrictions: almonds; carbs <50 gm while on VLCD   Cooking: self   Food Shopping: self    Physical Activity Intake   Activity: yard work or walking  For 30 minutes 2x/week  Frequency:n/a  Physical limitations/barriers to exercise: no intense exercise while on VLCD     Estimated Needs  Energy  [employer] [place] Needs:  BMR : 4191   1-2# loss weekly sedentary:  5851-3692             1-2# loss weekly lightly active: 5587-4829  Maintenance calories for sedentary activity level: 2677  Protein:  gm     (1 2-1 5g/kg IBW)  Fluid: 93 oz     (35mL/kg IBW)    Nutrition Diagnosis  Yes; Overweight/obesity  related to Excess energy intake as evidenced by  BMI more than normative standard for age and sex (obesity-grade II 35-39  9)       Nutrition Intervention    Nutrition Prescription  Calories: 760  Protein: 96 gm  Fluid: 80 oz    Meal Plan (John/Pro/Carb)  Breakfast: 200, 27, 10  Snack:  Lunch: 200, 27, 10  Snack: 160, 15, 13 net  Dinner: 200, 27, 10  Snack:    Nutrition Education:    VLCD  Physical activity     Nutrition Counseling:  Strategies: as above     Monitoring and Evaluation:  Evaluation criteria:  Energy Intake  Meet protein needs  Maintain adequate hydration  Monitor weekly weight  Physical activity     Barriers to learning:none  Readiness to change: Action:  (Changing behavior)  Comprehension: very good  Expected Compliance: very good

## 2022-08-09 ENCOUNTER — OFFICE VISIT (OUTPATIENT)
Dept: BARIATRICS | Facility: CLINIC | Age: 27
End: 2022-08-09
Payer: COMMERCIAL

## 2022-08-09 ENCOUNTER — OFFICE VISIT (OUTPATIENT)
Dept: FAMILY MEDICINE CLINIC | Facility: CLINIC | Age: 27
End: 2022-08-09
Payer: COMMERCIAL

## 2022-08-09 ENCOUNTER — TELEPHONE (OUTPATIENT)
Dept: BARIATRICS | Facility: CLINIC | Age: 27
End: 2022-08-09

## 2022-08-09 VITALS
HEART RATE: 77 BPM | HEIGHT: 71 IN | RESPIRATION RATE: 16 BRPM | DIASTOLIC BLOOD PRESSURE: 80 MMHG | SYSTOLIC BLOOD PRESSURE: 122 MMHG | OXYGEN SATURATION: 97 % | TEMPERATURE: 97.5 F | WEIGHT: 275.4 LBS | BODY MASS INDEX: 38.56 KG/M2

## 2022-08-09 VITALS
HEIGHT: 72 IN | SYSTOLIC BLOOD PRESSURE: 126 MMHG | DIASTOLIC BLOOD PRESSURE: 80 MMHG | WEIGHT: 272.4 LBS | BODY MASS INDEX: 36.9 KG/M2

## 2022-08-09 DIAGNOSIS — R03.0 ELEVATED BLOOD PRESSURE READING WITHOUT DIAGNOSIS OF HYPERTENSION: Primary | ICD-10-CM

## 2022-08-09 DIAGNOSIS — E66.09 CLASS 2 OBESITY DUE TO EXCESS CALORIES WITHOUT SERIOUS COMORBIDITY WITH BODY MASS INDEX (BMI) OF 37.0 TO 37.9 IN ADULT: ICD-10-CM

## 2022-08-09 PROCEDURE — S9470 NUTRITIONAL COUNSELING, DIET: HCPCS

## 2022-08-09 PROCEDURE — RECHECK

## 2022-08-09 PROCEDURE — 99213 OFFICE O/P EST LOW 20 MIN: CPT | Performed by: FAMILY MEDICINE

## 2022-08-09 NOTE — PROGRESS NOTES
Assessment/Plan:    No problem-specific Assessment & Plan notes found for this encounter  Diagnoses and all orders for this visit:    Elevated blood pressure reading without diagnosis of hypertension  Comments:  improved with weight loss  continue same no need for medications at this time        Subjective:      Patient ID: Tabby Hardy is a 32 y o  male  HPI  Pt presents in f/u for HTN  Has lost 40 lbs with weight mgmt  Doing well  Has been monitoring blood pressure  Since weight loss is has consistently been under 140/90  No chest pain, shortness of breath, n/v, abd pain, visual changes, paresthesias, weakness    The following portions of the patient's history were reviewed and updated as appropriate: allergies, current medications, past family history, past medical history, past social history, past surgical history and problem list     Review of Systems    See hpi    Objective:      /80   Pulse 77   Temp 97 5 °F (36 4 °C)   Resp 16   Ht 5' 11" (1 803 m)   Wt 125 kg (275 lb 6 4 oz)   SpO2 97%   BMI 38 41 kg/m²          Physical Exam  Vitals reviewed  Constitutional:       Appearance: Normal appearance  HENT:      Head: Normocephalic and atraumatic  Eyes:      Extraocular Movements: Extraocular movements intact  Conjunctiva/sclera: Conjunctivae normal    Cardiovascular:      Rate and Rhythm: Normal rate and regular rhythm  Heart sounds: No murmur heard  No friction rub  No gallop  Pulmonary:      Effort: Pulmonary effort is normal  No respiratory distress  Breath sounds: Normal breath sounds  No wheezing, rhonchi or rales  Neurological:      General: No focal deficit present  Mental Status: He is alert and oriented to person, place, and time

## 2022-08-09 NOTE — TELEPHONE ENCOUNTER
----- Message from Lidia Pathak, [address] sent at 8/9/2022  7:43 AM EDT -----  Please let the patient know I have reviewed his CMP and magnesium and they are all within acceptable range to continue VLCD   ThanksDede

## 2022-08-18 NOTE — PROGRESS NOTES
Weight Management Medical Nutrition Assessment   Is here for VLCD f/u (10 week)  Current wt: 265 7  lbs  Loss of  6 7 lbs x 2 weeks, overall loss of 56 8 lbs x 10 wks (avg 5 7  lbs/wk)  Still some struggles with hunger but seem to be when he is around food  Has some concerns r/t upcoming move and new job  He will finish last 2 wks of VLCD then transition to partial  He does not feel it is realistic to stay in ketosis at that time  Meal plan provided and I will see him 2 wks after his transition  Patient seen by Medical Provider in past 6 months:  yes  Requested to schedule appointment with Medical Provider: No    Anthropometric Measurements  Start Weight (#): 322 5 lbs 6/6/22  Current Weight (#): 265 7 lbs  TBW % Change from start weight: 17 6%  Ideal Body Weight (#):182 6 lbs BMI 25 (5'11 5" 175 lbs)  Goal Weight (#): 240 lbs     Weight Loss History  Previous weight loss attempts: Exercise  Self Created Diets (Portion Control, Healthy Food Choices, etc )    Food and Nutrition Related History  Wake up: 4:45  Bed Time: 9:30    Food Recall  Breakfast: 8:00: replacement     Snack:   Lunch: 1:00 p m  replacement  Snack: 3-3:30 bar   Dinner: 6:30: replacement   Snack:     Beverages: water, coffee (black)  Volume of beverage intake: 100-120 oz, 20 oz coffee    Weekends: Same  Cravings: not identified   Trouble area of day: not identified    Frequency of Eating out: none currently  Food restrictions: almonds; carbs <50 gm while on VLCD   Cooking: self   Food Shopping: self    Physical Activity Intake   Activity: yard work or walking  For 30 minutes 2x/week  Frequency:n/a  Physical limitations/barriers to exercise: no intense exercise while on VLCD     Estimated Needs  Energy  [employer] [place] [employer] Needs:  BMR : 2200   1-2# loss weekly sedentary:  5735-8843             1-2# loss weekly lightly active: 8590-7642  Maintenance calories for sedentary activity level: 2640  Protein:  gm     (1 2-1 5g/kg IBW)  Fluid: 93 oz     (35mL/kg IBW)    Nutrition Diagnosis  Yes; Overweight/obesity  related to Excess energy intake as evidenced by  BMI more than normative standard for age and sex (obesity-grade II 35-39  9)       Nutrition Intervention    Nutrition Prescription x 2 more weeks  Calories: 760  Protein: 96 gm  Fluid: 80 oz    After transition: 2047-5405 john    120-135 gm pro    75-90 gm carb    Meal Plan (John/Pro/Carb)  Breakfast: 200, 27, 10  Snack:  Lunch: 200, 27, 10  Snack: 160, 15, 13 net  Dinner: 200, 27, 10  Snack:    Nutrition Education:    VLCD  Physical activity     Nutrition Counseling:  Strategies: as above     Monitoring and Evaluation:  Evaluation criteria:  Energy Intake  Meet protein needs  Maintain adequate hydration  Monitor weekly weight  Physical activity     Barriers to learning:none  Readiness to change: Action:  (Changing behavior)  Comprehension: very good  Expected Compliance: very good

## 2022-08-23 ENCOUNTER — OFFICE VISIT (OUTPATIENT)
Dept: BARIATRICS | Facility: CLINIC | Age: 27
End: 2022-08-23
Payer: COMMERCIAL

## 2022-08-23 VITALS
WEIGHT: 265.7 LBS | DIASTOLIC BLOOD PRESSURE: 84 MMHG | SYSTOLIC BLOOD PRESSURE: 126 MMHG | BODY MASS INDEX: 35.99 KG/M2 | HEIGHT: 72 IN

## 2022-08-23 DIAGNOSIS — R63.5 ABNORMAL WEIGHT GAIN: ICD-10-CM

## 2022-08-23 PROCEDURE — RECHECK

## 2022-08-23 PROCEDURE — VLCD

## 2022-09-07 ENCOUNTER — OFFICE VISIT (OUTPATIENT)
Dept: BARIATRICS | Facility: CLINIC | Age: 27
End: 2022-09-07
Payer: COMMERCIAL

## 2022-09-07 VITALS
RESPIRATION RATE: 16 BRPM | BODY MASS INDEX: 34.96 KG/M2 | OXYGEN SATURATION: 98 % | HEART RATE: 66 BPM | HEIGHT: 72 IN | SYSTOLIC BLOOD PRESSURE: 118 MMHG | DIASTOLIC BLOOD PRESSURE: 82 MMHG | WEIGHT: 258.1 LBS

## 2022-09-07 DIAGNOSIS — E66.9 OBESITY, CLASS II, BMI 35-39.9: Primary | ICD-10-CM

## 2022-09-07 DIAGNOSIS — Z91.89 RISK FACTORS FOR OBSTRUCTIVE SLEEP APNEA: ICD-10-CM

## 2022-09-07 PROCEDURE — 99213 OFFICE O/P EST LOW 20 MIN: CPT | Performed by: NURSE PRACTITIONER

## 2022-09-07 NOTE — PROGRESS NOTES
Assessment/Plan:     Obesity, Class II, BMI 35-39 9  - Patient is pursuing Very Low Calorie Diet-VLCD  - Initial weight loss goal of 5-10% weight loss for improved health  - Just completed 12 weeks of VLCD - tolerated well  - Recently met with dietician and has partial meal replacement plan to transition off of VLCD  - Discussed weight loss medications  He will take some time to see how the partial meal replacement goes, hence will hold off on medications at this time and discuss more in detail at next follow-up  - Labs reviewed: CMP and magnesium 8/7/2022 and all within normal range  Initial VLCD start: 322 5 lbs  Current: 258 1 lbs  Change: -64 4 lbs  Goal: 240 lbs    Goals:  Continue with partial meal replacement plan  Keep up the great water intake  Start adding in formal exercise  Follow-up with dietician as planned    Risk factors for obstructive sleep apnea  - Will be undergoing sleep study  Gaudencio Pedro was seen today for follow-up  Diagnoses and all orders for this visit:    Obesity, Class II, BMI 35-39 9    Risk factors for obstructive sleep apnea        Follow up in approximately 1 month with Non-Surgical Physician/Advanced Practitioner  Subjective:   Chief Complaint   Patient presents with    Follow-up     MWM 12 wk VLCD       Patient ID: Wilbert Orona  is a 32 y o  male with excess weight/obesity here to pursue weight management  Patient is pursuing Very Low Calorie Diet-VLCD  Most recent notes and records were reviewed  HPI    Wt Readings from Last 10 Encounters:   09/07/22 117 kg (258 lb 1 6 oz)   08/23/22 121 kg (265 lb 11 2 oz)   08/09/22 124 kg (272 lb 6 4 oz)   08/09/22 125 kg (275 lb 6 4 oz)   08/01/22 129 kg (284 lb)   07/27/22 128 kg (281 lb 4 8 oz)   07/14/22 132 kg (291 lb 6 4 oz)   06/27/22 (!) 139 kg (305 lb 9 6 oz)   06/06/22 (!) 146 kg (322 lb 8 oz)   05/17/22 (!) 145 kg (318 lb 9 6 oz)     Just completed 12 weeks of VLCD  No emergency meals   Some increased snacking lately  Has partial meal replacement plan from dietician to transition from VLCD  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71  Will be leaving [employer] to take a position in [place]  Hydration- 80 oz water typically, 24 oz coffee with 1 tsp half and half recently  Alcohol- none  Exercise- nothing formal  Has been doing yard work    Colonoscopy: N/A      The following portions of the patient's history were reviewed and updated as appropriate: allergies, current medications, past family history, past medical history, past social history, past surgical history, and problem list     Review of Systems   HENT: Negative for sore throat  Respiratory: Negative for cough and shortness of breath  Cardiovascular: Negative for chest pain and palpitations  Gastrointestinal: Negative for abdominal pain, constipation, diarrhea, nausea and vomiting  Denies GERD  Musculoskeletal: Negative for arthralgias and back pain  Skin: Negative for rash  Psychiatric/Behavioral: Negative for suicidal ideas  Denies anxiety and depression       Objective:  /82 (BP Location: Left arm, Patient Position: Sitting, Cuff Size: Standard)   Pulse 66   Resp 16   Ht 5' 11 5" (1 816 m)   Wt 117 kg (258 lb 1 6 oz)   SpO2 98%   BMI 35 50 kg/m²     Physical Exam  Vitals and nursing note reviewed  Constitutional   General appearance: Abnormal   well developed and obese  Eyes No conjunctival injection  Ears, Nose, Mouth, and Throat Oral mucosa moist    Pulmonary   Respiratory effort: No increased work of breathing or signs of respiratory distress  Cardiovascular     Examination of extremities for edema and/or varicosities: Normal   no edema  Abdomen   Abdomen: Abnormal   The abdomen was obese      Musculoskeletal   Gait and station: Normal     Psychiatric   Orientation to person, place and time: Normal     Affect: appropriate

## 2022-09-07 NOTE — ASSESSMENT & PLAN NOTE
- Patient is pursuing Very Low Calorie Diet-VLCD  - Initial weight loss goal of 5-10% weight loss for improved health  - Just completed 12 weeks of VLCD - tolerated well  - Recently met with dietician and has partial meal replacement plan to transition off of VLCD  - Discussed weight loss medications  He will take some time to see how the partial meal replacement goes, hence will hold off on medications at this time and discuss more in detail at next follow-up  - Labs reviewed: CMP and magnesium 8/7/2022 and all within normal range  Initial VLCD start: 322 5 lbs  Current: 258 1 lbs  Change: -64 4 lbs  Goal: 240 lbs    Goals:  Continue with partial meal replacement plan  Keep up the great water intake  Start adding in formal exercise     Follow-up with dietician as planned

## 2022-09-13 NOTE — PROGRESS NOTES
Weight Management Medical Nutrition Assessment   Is here for meal planning after transition from VLCD 2 wks ago  Current wt: 252 2   lbs  Loss of  5 9 lbs x 2 weeks, overall loss of 70 3  lbs x 3 1/2 months  Has not found the transition to be difficult and denies hunger  He did have an episode of upper right quadrant pain that lasted ~90 minutes  He will notify CRNP if this occurs again and/or will seek medical care  Currently in the middle of a move so has not yet been able to incorporate exercise  He is hoping to start this once he is settled  Questions answered re: breakfast options  He will f/u in 1 month with DIANNA and ~3 months with me  Patient seen by Medical Provider in past 6 months:  yes  Requested to schedule appointment with Medical Provider: No    Anthropometric Measurements  Start Weight (#): 322 5 lbs 6/6/22  Current Weight (#): 252 2 lbs  TBW % Change from start weight: 21 8%  Ideal Body Weight (#):182 6 lbs BMI 25 (5'11 5" 175 lbs)  Goal Weight (#): 240 lbs     Weight Loss History  Previous weight loss attempts: Exercise  Self Created Diets (Portion Control, Healthy Food Choices, etc )    Food and Nutrition Related History  Wake up: 4:45  Bed Time: 9:30    Food Recall  Breakfast: 8:00: replacement     Snack: 10-10:30: balanced breaks  Lunch: 1:00 p m  replacement  Snack: 3-3:30 bar   Dinner: 6:30:  6 oz lean protein, 1-1 1/2 cup veggies, 1 cup fruit or zero sugar greek yogurt   Snack: cheese or turkey pepperoni or zero sugar beef jerky    Beverages: water, coffee (black)  Volume of beverage intake: 100-120 oz, 20 oz coffee    Weekends: Same  Cravings: not identified   Trouble area of day: not identified    Frequency of Eating out: none currently  Food restrictions: almonds   Cooking: self   Food Shopping: self    Physical Activity Intake   Activity: packing for move  Frequency:n/a  Physical limitations/barriers to exercise: n/a     Estimated Needs  Energy  [employer] [place] [employer] Needs:  BMR : 2139 1-2# loss weekly sedentary:  2346-4649             1-2# loss weekly lightly active: 2986-6565  Maintenance calories for sedentary activity level: 2567  Protein:  gm     (1 2-1 5g/kg IBW)  Fluid: 93 oz     (35mL/kg IBW)    Nutrition Diagnosis  Yes; Overweight/obesity  related to Excess energy intake as evidenced by  BMI more than normative standard for age and sex (obesity-grade I 26-30  9)       Nutrition Intervention    Nutrition Prescription     7616-4533 john  120-135 gm pro  75-90 gm carb    Meal Plan (John/Pro/Carb)  Breakfast: 200, 27, 10  Snack: 150-200, 5-10, 10-20  Lunch: 200, 27, 10  Snack: 160, 15, 18  Dinner: 400-500, 45, 25  Snack: 0-150, 0-10, 0-5    Nutrition Education:    Physical activity   Food logging    Nutrition Counseling:  Strategies: as above     Monitoring and Evaluation:  Evaluation criteria:  Energy Intake  Meet protein needs  Maintain adequate hydration  Monitor weekly weight  Physical activity   Meal planning  Food logging     Barriers to learning:none  Readiness to change: Action:  (Changing behavior)  Comprehension: very good  Expected Compliance: very good

## 2022-09-19 ENCOUNTER — OFFICE VISIT (OUTPATIENT)
Dept: BARIATRICS | Facility: CLINIC | Age: 27
End: 2022-09-19
Payer: COMMERCIAL

## 2022-09-19 VITALS — WEIGHT: 252.2 LBS | BODY MASS INDEX: 34.16 KG/M2 | HEIGHT: 72 IN

## 2022-09-19 DIAGNOSIS — E66.09 CLASS 1 OBESITY DUE TO EXCESS CALORIES WITH BODY MASS INDEX (BMI) OF 34.0 TO 34.9 IN ADULT, UNSPECIFIED WHETHER SERIOUS COMORBIDITY PRESENT: ICD-10-CM

## 2022-09-19 PROCEDURE — RECHECK

## 2022-09-19 PROCEDURE — S9470 NUTRITIONAL COUNSELING, DIET: HCPCS

## 2022-12-05 ENCOUNTER — OFFICE VISIT (OUTPATIENT)
Dept: BARIATRICS | Facility: CLINIC | Age: 27
End: 2022-12-05

## 2022-12-05 VITALS — BODY MASS INDEX: 34.38 KG/M2 | HEIGHT: 72 IN | WEIGHT: 253.8 LBS

## 2022-12-05 DIAGNOSIS — R63.5 ABNORMAL WEIGHT GAIN: ICD-10-CM

## 2022-12-05 NOTE — PROGRESS NOTES
Weight Management Medical Nutrition Assessment   Is here for 1 of 3 bundle  Current wt: 253 8   lbs  He has gained 1 6 lbs x 2 1/2 months, overall loss of 68 7 lbs x 6 months  States he was down to 242 lbs with partial plan but then things got hectic and he found himself going out more  Over the last month finds himself going back to old habits  Not tracking, more alcohol  Was doing the couch 2 5k nick but then stopped for a week and has not resumed  Encouraged food logging for self accountability  Encouraged planning meals ahead of time so that he is prepared  Suggestions provided  Since last visit has had multiple episodes of upper right quadrant pain and is meeting with surgeon re: gallstones  He will f/u in 2 months  Dislikes: deli meat    Patient seen by Medical Provider in past 6 months:  yes  Requested to schedule appointment with Medical Provider: No    Anthropometric Measurements  Start Weight (#): 322 5 lbs 6/6/22  Current Weight (#): 253 8 lbs  TBW % Change from start weight: 21 3%  Ideal Body Weight (#):182 6 lbs BMI 25 (5'11 5" 175 lbs)  Goal Weight (#): 240 lbs     Weight Loss History  Previous weight loss attempts: Exercise  Self Created Diets (Portion Control, Healthy Food Choices, etc )    Food and Nutrition Related History  Wake up: 5:45  Bed Time: 10:00    Food Recall  Breakfast: 6:00: replacement     Snack: 10-10:30: skip  Lunch: 12:00-1:00 p m    Wrap from caf OR replacement   Snack: 3-3:30: cheese/grapes OR bar   Dinner: 8:00:  Lightly breaded chicken, veggies, sweet potato fries OR teriyaki bowl (chicken, double veggies)   Snack: keto ice cream bar     Beverages: water, coffee (black), alcohol   Volume of beverage intake: 64 oz water, 20 oz coffee    Weekends: Same  Cravings: not identified   Trouble area of day: not identified    Frequency of Eating out: 2x/wk  Food restrictions: almonds   Cooking: self   Food Shopping: self    Physical Activity Intake   Activity:  None currently (was doing couch 2 5k)  Frequency:n/a  Physical limitations/barriers to exercise: n/a     Estimated Needs  Energy  [employer] [place] [employer] Needs: BMR : 2139   1-2# loss weekly sedentary:  2999-7840             1-2# loss weekly lightly active: 3171-2653  Maintenance calories for sedentary activity level: 2567  Protein:  gm     (1 2-1 5g/kg IBW)  Fluid: 93 oz     (35mL/kg IBW)    Nutrition Diagnosis  Yes; Overweight/obesity  related to Excess energy intake as evidenced by  BMI more than normative standard for age and sex (obesity-grade I 26-30  9)       Nutrition Intervention    Nutrition Prescription     9507-3562 john  125-140 gm pro    Meal Plan (John/Pro/Carb)  Breakfast: 200, 27   Snack: 150-200, 5-10   Lunch: 500, 35  Snack: 160, 15  Dinner: 400-500, 45   Snack: 0-150, 0-10, 0-5    Nutrition Education:    Physical activity   Food logging  Meal planning/preparation    Nutrition Counseling:  Strategies: as above     Monitoring and Evaluation:  Evaluation criteria:  Energy Intake  Meet protein needs  Maintain adequate hydration  Monitor weekly weight  Physical activity   Meal planning  Food logging     Barriers to learning:none  Readiness to change: Relapse: (Returning to older behaviors and abandoning the new changes)   Comprehension: very good  Expected Compliance: good

## 2024-11-26 DIAGNOSIS — Z00.6 ENCOUNTER FOR EXAMINATION FOR NORMAL COMPARISON OR CONTROL IN CLINICAL RESEARCH PROGRAM: ICD-10-CM
